# Patient Record
Sex: MALE | Race: WHITE | NOT HISPANIC OR LATINO | Employment: OTHER | URBAN - METROPOLITAN AREA
[De-identification: names, ages, dates, MRNs, and addresses within clinical notes are randomized per-mention and may not be internally consistent; named-entity substitution may affect disease eponyms.]

---

## 2020-03-31 ENCOUNTER — HOSPITAL ENCOUNTER (OUTPATIENT)
Dept: SURGERY | Facility: CLINIC | Age: 70
Discharge: HOME | End: 2020-03-31
Attending: UROLOGY

## 2020-03-31 ENCOUNTER — OFFICE VISIT CONVERTED (OUTPATIENT)
Dept: UROLOGY | Facility: CLINIC | Age: 70
End: 2020-03-31
Attending: UROLOGY

## 2020-03-31 LAB
APPEARANCE UR: CLEAR
BILIRUB UR QL: NEGATIVE
COLOR UR: YELLOW
CONV COLLECTION SOURCE (UA): NORMAL
CONV UROBILINOGEN IN URINE BY AUTOMATED TEST STRIP: 0.2 {EHRLICHU}/DL (ref 0.1–1)
GLUCOSE UR QL: NEGATIVE MG/DL
HGB UR QL STRIP: NEGATIVE
KETONES UR QL STRIP: NEGATIVE MG/DL
LEUKOCYTE ESTERASE UR QL STRIP: NEGATIVE
NITRITE UR QL STRIP: NEGATIVE
PH UR STRIP.AUTO: 6.5 [PH] (ref 5–8)
PROT UR QL: NEGATIVE MG/DL
SP GR UR: 1.02 (ref 1–1.03)

## 2020-06-02 ENCOUNTER — TELEPHONE CONVERTED (OUTPATIENT)
Dept: UROLOGY | Facility: CLINIC | Age: 70
End: 2020-06-02
Attending: UROLOGY

## 2020-09-08 ENCOUNTER — TELEPHONE CONVERTED (OUTPATIENT)
Dept: UROLOGY | Facility: CLINIC | Age: 70
End: 2020-09-08
Attending: UROLOGY

## 2020-09-28 ENCOUNTER — PROCEDURE VISIT CONVERTED (OUTPATIENT)
Dept: UROLOGY | Facility: CLINIC | Age: 70
End: 2020-09-28
Attending: UROLOGY

## 2020-09-28 ENCOUNTER — HOSPITAL ENCOUNTER (OUTPATIENT)
Dept: UROLOGY | Facility: CLINIC | Age: 70
Discharge: HOME | End: 2020-09-28
Attending: UROLOGY

## 2020-10-01 LAB
BACTERIA UR CULT: ABNORMAL
CIPROFLOXACIN SUSC ISLT: <=0.5
DAPTOMYCIN SUSC ISLT: 0.25
DOXYCYCLINE SUSC ISLT: 1
ERYTHROMYCIN SUSC ISLT: >=8
GENTAMICIN SUSC ISLT: <=0.5
LEVOFLOXACIN SUSC ISLT: <=0.12
NITROFURANTOIN SUSC ISLT: <=16
OXACILLIN SUSC ISLT: >=4
RIFAMPIN SUSC ISLT: <=0.5
TETRACYCLINE SUSC ISLT: <=1
TIGECYCLINE SUSC ISLT: 0.25
TMP SMX SUSC ISLT: <=10
VANCOMYCIN SUSC ISLT: 1

## 2020-10-19 ENCOUNTER — PROCEDURE VISIT CONVERTED (OUTPATIENT)
Dept: UROLOGY | Facility: CLINIC | Age: 70
End: 2020-10-19
Attending: UROLOGY

## 2020-11-20 ENCOUNTER — OFFICE VISIT CONVERTED (OUTPATIENT)
Dept: UROLOGY | Facility: CLINIC | Age: 70
End: 2020-11-20
Attending: UROLOGY

## 2020-12-08 ENCOUNTER — HOSPITAL ENCOUNTER (OUTPATIENT)
Dept: SURGERY | Facility: CLINIC | Age: 70
Discharge: HOME | End: 2020-12-08
Attending: UROLOGY

## 2020-12-08 ENCOUNTER — OFFICE VISIT CONVERTED (OUTPATIENT)
Dept: UROLOGY | Facility: CLINIC | Age: 70
End: 2020-12-08
Attending: UROLOGY

## 2020-12-10 LAB — BACTERIA UR CULT: NORMAL

## 2020-12-22 ENCOUNTER — OFFICE VISIT CONVERTED (OUTPATIENT)
Dept: UROLOGY | Facility: CLINIC | Age: 70
End: 2020-12-22
Attending: UROLOGY

## 2021-02-09 ENCOUNTER — OFFICE VISIT CONVERTED (OUTPATIENT)
Dept: UROLOGY | Facility: CLINIC | Age: 71
End: 2021-02-09
Attending: UROLOGY

## 2021-02-09 ENCOUNTER — CONVERSION ENCOUNTER (OUTPATIENT)
Dept: SURGERY | Facility: CLINIC | Age: 71
End: 2021-02-09

## 2021-02-09 LAB
BILIRUB UR QL STRIP: NEGATIVE
COLOR UR: YELLOW
CONV CLARITY OF URINE: CLEAR
CONV PROTEIN IN URINE BY AUTOMATED TEST STRIP: NEGATIVE
CONV UROBILINOGEN IN URINE BY AUTOMATED TEST STRIP: NORMAL
GLUCOSE UR QL: NEGATIVE
HGB UR QL STRIP: NEGATIVE
KETONES UR QL STRIP: NEGATIVE
LEUKOCYTE ESTERASE UR QL STRIP: NORMAL
NITRITE UR QL STRIP: NEGATIVE
PH UR STRIP.AUTO: 7.5 [PH]
SP GR UR: 1.02

## 2021-05-10 NOTE — H&P
History and Physical      Patient Name: Pa Montano   Patient ID: [id number]   Sex: Male   YOB: 1950    Primary Care Provider: Piyush Lyon MD   Referring Provider: Piyush Lyon MD    Visit Date: March 31, 2020    Provider: Lui Weir MD   Location: [facility]   Location Address: [address] [place] [address]  [id number]   Location Phone: [phone]          Chief Complaint  · pt here for urologic issues      History Of Present Illness     69-year-old  gentleman with possible BPH and here today for elevated PSA    slower stream. Some trouble with initiation of stream. Nocturia X 3-4 .  Some daytime frequency every few.  No incontinence.  No history of prostate meds    UTI 6 weeks ago that was treated.  Did have a fever, frequency and urgency.     PVR today 72    Patient has had a CT several years ago that did show some nonobstructing stones.  He has never passed a kidney stone.  She was prescribed ciprofloxacin 500 mg twice a day for 30 days.  He finished this about 2 weeks ago.  No urologic family history    No cardiopulmonary history.  Patient does not smoke.  Patient does not use blood thinner.  Patient is very active.  Walks 5 miles every day.    Patient has been worked up for gross hematuria before    2002 cystoscopynegative, also had some imaging at that time that was negative      3/20 Cr 0.74, GFR 11    PSA    3/20  7.32  2/20  7.38  6/19 3.83  5/18 2.85  3/16 2.26  1/13 1.63  12/12 1.87  10/06 3.1       Past Medical History  Allergic rhinitis, chronic; Bladder Disorder; Kidney stones; Mood disorder; Prostate Disorder         Allergy List  NO KNOWN DRUG ALLERGIES         Review of Systems  · Constitutional  o Denies  o : chills  · Respiratory  o Denies  o : cough  · Gastrointestinal  o Denies  o : nausea       10 systems reviewed       Vitals  Date Time BP Position Site L\R Cuff Size HR RR TEMP (F) WT  HT  BMI kg/m2 BSA m2 O2 Sat HC        03/31/2020 08:50 AM       16  182lbs 6oz 6'   24.73 2.05           Physical Examination  · Constitutional  o Appearance  o : Well-appearing, well-developed, in no acute distress  · Respiratory  o Respiratory Effort  o : Unlabored breathing  o Auscultation of Lungs  o : Unlabored breathing, clear to auscultation bilaterally  · Cardiovascular  o Heart  o :   § Auscultation of Heart  § : Regular rate and rhythm, no murmurs  · Gastrointestinal  o Abdominal Examination  o : Nontender, nondistended, no rigidity or guarding, no hepatosplenomegaly  · Genitourinary  o Bladder  o : nonpalpable  o Penis  o : circumcised phallus with no masses or lesions  o Urethral Meatus  o : no inflammation present and no stenosis  o Scrotum and Scrotal Contents  o :   § Testes  § : Bilateral descended testicles no masses or lesions  o Digital Rectal Examination  o :   § Tone and Masses  § : Normal sphincter tone, no rectal masses present  § Prostate  § : prostate nontender with no nodules and normal consistency, 40 g  · Neurologic  o Mental Status Examination  o :   § Orientation  § : Grossly oriented to person, place and time, judgment and insight intact, normal mood and affect          Results  · In-Office Procedures  o Surgical procedure  § IOP - Bladder Scan/Residual Urine (08814)   § Specimen vol Ur: 72   o Lab procedure  § Automated Dipstick Urinalysis (Surg Spec) WITHOUT Micro [facility] (54188)   § Color Ur: Yellow   § Clarity Ur: Clear   § Glucose Ur Ql Strip: Negative   § Bilirub Ur Ql Strip: Negative   § Ketones Ur Ql Strip: Negative   § Sp Gr Ur Qn: 1.020   § Hgb Ur Ql Strip: Trace-intact   § pH Ur-LsCnc: 6.0   § Prot Ur Ql Strip: Negative   § Urobilinogen Ur Strip-mCnc: 0.2   § Nitrite Ur Ql Strip: Negative   § WBC Est Ur Ql Strip: Negative       Assessment  · Urinary tract infection     599.0/N39.0  · BPH (benign prostatic hyperplasia)     600.00/N40.0      Plan  · Orders  o PSA Screening, Ultrasensitive, MEDICARE [facility] () -  600.00/N40.0 - 05/31/2020  o Urinalysis dipstick auto w micro (44527) - 599.0/N39.0 - 03/31/2020  · Medications  o Medications have been Reconciled  o Transition of Care or Provider Policy  · Instructions  o Electronically Identified Patient Education Materials Provided Electronically       Records reviewed today and summarized in the chart    Trace blood will send urine for micro    Patient just finished ciprofloxacin a couple weeks ago.  Did discuss that his PSA elevation may be secondary to UTI with some prostatitis.  We will plan on rechecking a PSA in 2 months.  I did discuss with the patient possible he could have prostate cancer.  Patient understands this and knows he must follow-up or have the possibility of missing a urologic malignancy.  Patient voiced understanding    BPH    Patient is having some lower urinary tract symptoms and also had a recent UTI we will go ahead and start him on Flomax 0.4 mg daily.  Risk/benefits and side effects discussed today    PVR at follow-up    Greater than 30 minutes was used in counseling and coordination of care, with greater than 51% of this in face-to-face counseling             Electronically Signed by: Lui Weir MD -Author on April 3, 2020 07:34:44 AM

## 2021-05-10 NOTE — PROCEDURES
Procedure Note      Patient Name: Pa Montano   Patient ID: [id number]   Sex: Male   YOB: 1950    Primary Care Provider: Piyush Lyon MD   Referring Provider: Piyush Lyon MD    Visit Date: September 28, 2020    Provider: Lui Weir MD   Location: [facility]   Location Address: [address] [place] [address]  [id number]   Location Phone: [phone]          Cystoscopy Procedure:  The patients urine was viewe d under a microscope during his clinical visit: no RBC present, no WBC present, no Bacteria present.          PROCEDURE: Flexible cystoscope was passed per urethra into the bladder without difficulty after proper consent.     3 cm prostate, minor to moderate trabeculations throughout    The bladder was inspected in a systematic meridian fashion. There were no tumors, lesions, stones, or other abnormalities noted within the bladder. Of note, there was no increased vascularity as well. Both ureteral orifices were identified and were normal in appearance. The flexible cystoscope was removed. The patient tolerated the procedure well.           Assessment  · BPH (benign prostatic hyperplasia)     600.00/N40.0      Plan  · Orders  o Cystoscopy (43627) - 600.00/N40.0 - 09/28/2020  o Urine Culture (Clean Catch) [facility] (15745) - 600.00/N40.0 - 09/28/2020  · Medications  o Medications have been Reconciled  o Transition of Care or Provider Policy  · Instructions  o Electronically Identified Patient Education Materials Provided Electronically     Patient wanting to proceed with Rezum.  Risk and benefits discussed today.  He will follow-up for procedure             Electronically Signed by: Lui Weir MD -Author on September 28, 2020 03:36:29 PM

## 2021-05-10 NOTE — PROCEDURES
Procedure Note      Patient Name: Pa Montano   Patient ID: [id number]   Sex: Male   YOB: 1950    Primary Care Provider: Piyush Lyon MD   Referring Provider: Piyush Lyon MD    Visit Date: October 19, 2020    Provider: Lui Weir MD   Location: [facility]   Location Address: [address] [place] [address]  [id number]   Location Phone: [phone]          Pa Montano presents to the office today to undergo a Rezum procedure. The patient understands the material risks, possible benefits, and alternatives for symptomatic Benign Prostate Hypertrophy. The patient understands that BLADDER FUNCTION is independent of this de-obstructing procedure. Improved voiding results may take 2 to 4 weeks before being noticed depending on prostate size and number of treatments. Bleeding, infection and irritative voiding are the most common side effects. Patients will continue to use their current BPH meds until advised to taper or stop. The patient was given the opportunity to have their questions answered in a previous face to face meeting and also today. Informed consent signed by a patient.   Patient medications and allergies have been reviewed prior to treatment. Patients on blood thinners or aspirin (ASA) have been advised to check with the prescribing MD if it is okay to hold these prior to treatment. Risks of withholding blood thinners and or ASA have been discussed.   Prior to proceeding, transrectal ultrasound obtained prostate volume and dimensions.   Prostate: 4.8 g HT: 3.0 mm W: 5.0 mm L: 38.4 mm   Candida-prostatic block anesthesia was then performed using trans-rectal ultrasound guidance and a long 18-gauge spinal needle. 21 cc's of 1% lidocaine was injected bilaterally. Minimal blood loss.   Description of Procedure:   Transurethral destruction of prostate tissue; by radiofrequency thermotherapy. In the lithotomy position, under local anesthesia, the  patient was prepped and draped in the usual manner. The anterior urethra, prostate, bladder neck, right and left orifices were visualized as the scope was passed. RF was then used to create thermal energy to selectively ablate prostate tissue 1 cm from the bladder neck to the proximal end of the veru spaced 1 cm apart.   4 Treatments were given. Treatments: Rt. Side 0-4 2, Lt. Side 0-4 2. Median lobe 0-3 0. Blood loss was minimal. The patient was monitored throughout the procedure.   After the completion of the procedure the treatment device was removed. There was a careful check that there were no clots in the bladder or injury to the bladder, prostate, or urethra. Murdock catheter was then placed.           Assessment  · Benign prostatic hyperplasia with lower urinary tract symptoms     600.01/N40.1      Plan  · Orders  o Transurethral destruction of prostate tissue; by radiofrequency-generated water vapor thermotherapy (12490) - 600.01/N40.1 - 10/19/2020  · Medications  o Medications have been Reconciled  o Transition of Care or Provider Policy  · Instructions  o Urine was negative for nitrite, WBC, and RBC's.   o The patient was observed, appeared stable and in good condition at the time of discharge. Post-procedure expectations and activity limitations were reviewed with him and written instructions were provided. The patient is going home with a Murdock catheter, therefore catheter care and PRN irrigation instructions were reviewed and written post-op instructions were provided. He is scheduled to return to the office for its removal. He was also instructed to continue mild oral analgesia PRN. The patient was then transported by our attending staff member to his car by wheelchair. His care was given unto a consenting adult who is responsible for his transportation home.  o Electronically Identified Patient Education Materials Provided Electronically            Electronically Signed by: Lui Weir MD  -Author on October 19, 2020 04:03:51 PM

## 2021-05-13 NOTE — PROGRESS NOTES
Progress Note      Patient Name: Pa Montano   Patient ID: [id number]   Sex: Male   YOB: 1950    Primary Care Provider: Piyush Lyon MD   Referring Provider: Piyush Lyon MD    Visit Date: June 2, 2020    Provider: Lui Weir MD   Location: [facility]   Location Address: [address] [place] [address]  [id number]   Location Phone: [phone]          Chief Complaint  · pt here for urologic issues      History Of Present Illness  Pa Montano is a 69 year old /White male who is presenting for evaluation via telephone call. Verbal consent obtained before beginning visit.   The following staff were present during this visit: [employer].      15 minutes was used on this telephone call    69-year-old  gentleman with possible BPH and here today for elevated PSA    On Flomax since his last visit. stream is better.  Nocturia X 2 .  freq is better.  No incontinence.  Minimal dizziness.    PVR     3/20  72    ciprofloxacin 500 mg twice a day for 30 days in March.      3/20 UAnegative    pt did not do his PSA.    Previous    Patient has had a CT several years ago that did show some nonobstructing stones.  He has never passed a kidney stone.    No cardiopulmonary history.  Patient does not smoke.  Patient does not use blood thinner.  Patient is very active.  Walks 5 miles every day.    Patient has been worked up for gross hematuria before    2002 cystoscopy negative, also had some imaging at that time that was negative      3/20 Cr 0.74, GFR 11    PSA    3/20  7.32  2/20  7.38  6/19 3.83  5/18 2.85  3/16 2.26  1/13 1.63  12/12 1.87  10/06 3.1       Past Medical History  Allergic rhinitis, chronic; Bladder Disorder; Kidney stones; Mood disorder; Prostate Disorder         Past Surgical History  Wrist Surgery         Medication List  citalopram 20 mg oral tablet; Flomax 0.4 mg oral capsule; ibuprofen 200 mg oral tablet; lorazepam 2 mg oral tablet;  Vitamin D3 oral         Allergy List  NO KNOWN DRUG ALLERGIES       Allergies Reconciled  Social History  Tobacco (Never)         Review of Systems  · Constitutional  o Denies  o : chills  · Respiratory  o Denies  o : cough  · Gastrointestinal  o Denies  o : nausea       10 systems reviewed       Vitals  Date Time BP Position Site L\R Cuff Size HR RR TEMP (F) WT  HT  BMI kg/m2 BSA m2 O2 Sat HC       03/31/2020 08:50 AM       16  182lbs 6oz 6'   24.73 2.05               Assessment  · Urinary tract infection     599.0/N39.0  · BPH (benign prostatic hyperplasia)     600.00/N40.0    Problems Reconciled  Plan  · Orders  o Physician Telephone Evaluation, 11-20 minutes (67340) - 599.0/N39.0, 600.00/N40.0 - 06/02/2020  · Medications  o Medications have been Reconciled  o Transition of Care or Provider Policy  · Instructions  o Electronically Identified Patient Education Materials Provided Electronically     BPH    Continue Flomax 0.4 mg daily      Elevated PSA    Patient has not yet done his PSA he would get this done at [place].  He will call and let my nurse know so she can acquire these records.    Follow-up in 6 months with a PVR             Electronically Signed by: Lui Weir MD -Author on June 2, 2020 02:29:39 PM

## 2021-05-13 NOTE — PROGRESS NOTES
Progress Note      Patient Name: Pa Montano   Patient ID: [id number]   Sex: Male   YOB: 1950    Primary Care Provider: Piyush Lyon MD   Referring Provider: Piyush Lyon MD    Visit Date: November 20, 2020    Provider: Lui Weir MD   Location: [facility]   Location Address: [address] [place] [address]  [id number]   Location Phone: [phone]          Chief Complaint  · pt here for urologic issues      History Of Present Illness         69-year-old  gentleman with possible BPH and here today for elevated PSA    10/19/20  Rezum    Patient has some minimal dysuria and burning.  Some frequency.  Minimal gross hematuria.    no prostate meds.    PVR     11/20  25  3/20    72    Previous    Flomax 0.4-  caused sever dizzeness.  Stopped this    ciprofloxacin 500 mg twice a day for 30 days in March.      3/20 UA negative    Patient has had a CT several years ago that did show some nonobstructing stones.  He has never passed a kidney stone.    No cardiopulmonary history.  Patient does not smoke.  Patient does not use blood thinner.  Patient is very active.  Walks 5 miles every day.    Patient has been worked up for gross hematuria before    2002 cystoscopy negative, also had some imaging at that time that was negative      3/20 Cr 0.74, GFR 11    PSA    6/20   4.38  3/20  7.32  2/20  7.38  6/19 3.83  5/18 2.85  3/16 2.26  1/13 1.63  12/12 1.87  10/06 3.1       Past Medical History  Allergic rhinitis, chronic; Bladder Disorder; BPH; Kidney stones; Mood disorder; Prostate Disorder; UTI         Past Surgical History  Wrist Surgery         Medication List  citalopram 20 mg oral tablet; ibuprofen 200 mg oral tablet; lorazepam 2 mg oral tablet; Valium 5 mg oral tablet; Vitamin D3 oral         Allergy List  NO KNOWN DRUG ALLERGIES         Social History  Tobacco (Never)         Review of Systems  · Constitutional  o Denies  o : chills,  fever  · Gastrointestinal  o Denies  o : nausea, vomiting      Physical Examination  · Constitutional  o Appearance  o : Well-appearing, well-developed, in no acute distress  · Respiratory  o Respiratory Effort  o : Unlabored breathing  · Neurologic  o Mental Status Examination  o :   § Orientation  § : Grossly oriented to person, place and time, judgment and insight intact, normal mood and affect          Results  · In-Office Procedures  o Surgical procedure  § IOP - Bladder Scan/Residual Urine (00155)   § Specimen vol Ur: 028       Assessment  · BPH (benign prostatic hyperplasia)     600.00/N40.0  · Elevated PSA     790.93/R97.20      Plan  · Orders  o PSA Ultrasensitive, ANNUAL SCREENING [facility] (00556, ) - 790.93/R97.20 - 06/25/2021  · Medications  o Medications have been Reconciled  o Transition of Care or Provider Policy  · Instructions  o Electronically Identified Patient Education Materials Provided Electronically       Doing well after procedure.      F/u 6/21 with PSA.      PVR at f/u             Electronically Signed by: Lui Weir MD -Author on November 20, 2020 12:03:26 PM

## 2021-05-13 NOTE — PROGRESS NOTES
Progress Note      Patient Name: Pa Montano   Patient ID: [id number]   Sex: Male   YOB: 1950    Primary Care Provider: Piyush Lyon MD   Referring Provider: Piyush Lyon MD    Visit Date: September 8, 2020    Provider: Lui Weir MD   Location[facility]   Location Address: [address] [place] [address]  [id number]   Location Phone: [phone]          Chief Complaint  · pt here for urologic issues      History Of Present Illness  TELEHEALTH TELEPHONE VISIT  Pa Montano is a 69 year old /White male who is presenting for evaluation via telehealth telephone visit. Verbal consent obtained before beginning visit.   Provider spent 13 minutes with the patient during the telehealth visit.   The following staff were present during this visit: Zakiya Gar   Past Medical History/ Overview of Patient Symptoms         69-year-old  gentleman with possible BPH and here today for elevated PSA    Pt with weak stream.  Hard to initiate.  Nocturia times 4.  Flomax did help.    Flomax 0.4-  caused sever dizzeness.  Stopped this.    No incontinence.  Gross hematuria, some intermittent dysuria, not too bad    PVR     3/20  72    Previous    ciprofloxacin 500 mg twice a day for 30 days in March.      3/20 UA negative    Patient has had a CT several years ago that did show some nonobstructing stones.  He has never passed a kidney stone.    No cardiopulmonary history.  Patient does not smoke.  Patient does not use blood thinner.  Patient is very active.  Walks 5 miles every day.    Patient has been worked up for gross hematuria before    2002 cystoscopy negative, also had some imaging at that time that was negative      3/20 Cr 0.74, GFR 11    PSA    6/20   4.38  3/20  7.32  2/20  7.38  6/19 3.83  5/18 2.85  3/16 2.26  1/13 1.63  12/12 1.87  10/06 3.1       Past Medical History  Allergic rhinitis, chronic; Bladder Disorder; BPH; Kidney stones;  Mood disorder; Prostate Disorder; UTI         Past Surgical History  Wrist Surgery         Medication List  citalopram 20 mg oral tablet; ibuprofen 200 mg oral tablet; lorazepam 2 mg oral tablet; Vitamin D3 oral         Allergy List  NO KNOWN DRUG ALLERGIES         Social History  Tobacco (Never)         Review of Systems  · Constitutional  o Denies  o : chills  · Respiratory  o Denies  o : cough  · Gastrointestinal  o Denies  o : nausea              Assessment  · BPH (benign prostatic hyperplasia)     600.00/N40.0  · Elevated PSA     790.93/R97.20    Problems Reconciled  Plan  · Orders  o Physician Telephone Evaluation, 11-20 minutes (37770) - 600.00/N40.0, 790.93/R97.20 - 09/08/2020  · Medications  o Medications have been Reconciled  o Transition of Care or Provider Policy  · Instructions  o Plan Of Care:   o Electronically Identified Patient Education Materials Provided Electronically       BPH    Stop Flomax, cannot tolerate.  Does not want to try another alpha-blocker.    Patient interested in procedures, after discussion he is not interested in TURP.  Very worried about sexual dysfunction.  We did discuss risk and benefits of Rezum.  After discussion patient is very interested in this and would like to proceed if possible.  He has had a lot of trouble with office procedure in the past but said he is willing to give this a try    Follow-up for office cystoscopy in preparation for Rezum    Elevated PSA    PSA is increased from previous PSAs but lower than last year.  At this time still low for his age.  We will continue to follow this conservatively moving forward.  Patient understands.                 Electronically Signed by: Lui Weir MD -Author on September 8, 2020 08:36:00 AM

## 2021-05-13 NOTE — PROGRESS NOTES
Progress Note      Patient Name: Pa Montano   Patient ID: [id number]   Sex: Male   YOB: 1950    Primary Care Provider: Piyush Lyon MD   Referring Provider: Piyush Lyon MD    Visit Date: December 8, 2020    Provider: Lui Weir MD   Location: [facility]   Location Address: [address] [place] [address]  [id number]   Location Phone: [phone]          Chief Complaint  · urologic issues      History Of Present Illness       70-year-old  gentleman with possible BPH and here today for elevated PSA    pt was voiding well up to last week.  Good stream.  Since last week over the weekend patient started having more trouble.  He has had harder initiation of stream and slower stream over the last few days.  He is having some urgency and frequency.    Some burning and dysuria.    no prostate meds.    PVR       12/20    408  10/19/20  Rezum  11/20  25  3/20    72    Previous    Flomax 0.4-  caused sever dizzeness.  Stopped this    ciprofloxacin 500 mg twice a day for 30 days in March.      Patient has had a CT several years ago that did show some nonobstructing stones.  He has never passed a kidney stone.    No cardiopulmonary history.  Patient does not smoke.  Patient does not use blood thinner.  Patient is very active.  Walks 5 miles every day.    Patient has been worked up for gross hematuria before    2002 cystoscopy negative, also had some imaging at that time that was negative      3/20 Cr 0.74, GFR 11    PSA    6/20   4.38  3/20  7.32  2/20  7.38  6/19 3.83  5/18 2.85  3/16 2.26  1/13 1.63  12/12 1.87  10/06 3.1       Past Medical History  Allergic rhinitis, chronic; Bladder Disorder; BPH; Kidney stones; Mood disorder; Prostate Disorder; UTI         Past Surgical History  Rezum procedure; Wrist Surgery         Medication List  citalopram 20 mg oral tablet; ibuprofen 200 mg oral tablet; lorazepam 2 mg oral tablet; Vitamin D3 oral         Allergy  List  NO KNOWN DRUG ALLERGIES         Social History  Tobacco (Never)         Review of Systems  · Constitutional  o Denies  o : chills  · Respiratory  o Denies  o : cough  · Gastrointestinal  o Denies  o : nausea      Physical Examination  · Constitutional  o Appearance  o : Well-appearing, well-developed, in no acute distress  · Respiratory  o Respiratory Effort  o : Unlabored breathing  · Cardiovascular  o Heart  o :   § Auscultation of Heart  § : Regular rate and rhythm, no murmurs  · Neurologic  o Mental Status Examination  o :   § Orientation  § : Grossly oriented to person, place and time, judgment and insight intact, normal mood and affect          Results  · In-Office Procedures  o Surgical procedure  § IOP - Bladder Scan/Residual Urine (46014)   § Specimen vol Ur: 408   o Lab procedure  § Automated Dipstick Urinalysis (Surg Spec) WITHOUT Micro H (72155)   § Color Ur: Yellow   § Clarity Ur: Clear   § Glucose Ur Ql Strip: Negative   § Bilirub Ur Ql Strip: Negative   § Ketones Ur Ql Strip: Negative   § Sp Gr Ur Qn: less than 1.000   § Hgb Ur Ql Strip: Negative   § pH Ur-LsCnc: 5.0   § Prot Ur Ql Strip: Negative   § Urobilinogen Ur Strip-mCnc: 0.2 E.U./dL   § Nitrite Ur Ql Strip: Negative   § WBC Est Ur Ql Strip: Small       Assessment  · BPH (benign prostatic hyperplasia)     600.00/N40.0  · Elevated PSA     790.93/R97.20  · Prostatitis     601.9/N41.9      Plan  · Orders  o BMP Non-fasting [facility] ([id number]) - 601.9/N41.9 - 12/08/2020  o Urine Culture (Clean Catch) [facility] ([id number]) - 601.9/N41.9, 600.00/N40.0 - 12/08/2020  · Medications  o Medications have been Reconciled  o Transition of Care or Provider Policy  · Instructions  o Electronically Identified Patient Education Materials Provided Electronically       Patient with likely prostatitis.  After discussion we will place him on ciprofloxacin 500 mg p.o. twice daily x4 weeks.      Risk/benefit and side effects discussed    BMP    Urine culture    Follow-up in 2  weeks with a PVR                 Electronically Signed by: Lui Weir MD -Author on December 8, 2020 02:58:01 PM

## 2021-05-14 VITALS — RESPIRATION RATE: 13 BRPM | HEIGHT: 72 IN | WEIGHT: 175.5 LBS | BODY MASS INDEX: 23.77 KG/M2

## 2021-05-14 NOTE — PROGRESS NOTES
Progress Note      Patient Name: Pa Montano   Patient ID: [id number]   Sex: Male   YOB: 1950    Primary Care Provider: Piyush Lyon MD   Referring Provider: Piyush Lyon MD    Visit Date: February 9, 2021    Provider: Lui Weir MD   Location[place] [facility]   Location Address: [address] [place] [address]  [id number]   Location Phone: [phone]          Chief Complaint  · urological issues      History Of Present Illness       70-year-old  gentleman h/o BPH, ED and elevated PSA    Patient over the last 1.5 months has past several small stones.  No pain. No blood.    Voiding better since rezum.  Does think Rezum has helped.  Stream, no burning or dysuria.    With recent prostatitis, did ciprofloxacin and now is better.    no prostate meds.     cannot maintain erection throughout intercourse.  Has a prescription for sildenafil, has not tried this yet.    No cardiopulmonary history.  Patient does not smoke.  Patient does not use blood thinner.  Patient is very active.  Walks 5 miles every day.    PVR     2/21   50  12/20    120  12/20    408  10/19/20  Rezum  11/20  25  3/20    72    Previous    9/20 cystoscopy3 cm prostate, minor to moderate trabeculations, negative otherwise.    Flomax 0.4-  caused sever dizzeness.  Stopped this    Patient has had a CT several years ago that did show some nonobstructing stones.  He has never passed a kidney stone.    Patient has been worked up for gross hematuria before    2002 cystoscopy negative, also had some imaging at that time that was negative      3/20 Cr 0.74, GFR 11    PSA    6/20   4.38  3/20  7.32  2/20  7.38  6/19 3.83  5/18 2.85  3/16 2.26  1/13 1.63  12/12 1.87  10/06 3.1       Past Medical History  Allergic rhinitis, chronic; Bladder Disorder; BPH; Kidney stones; Mood disorder; Prostate Disorder; UTI         Past Surgical History  Rezum procedure; Wrist Surgery         Medication  List  citalopram 20 mg oral tablet; ibuprofen 200 mg oral tablet; lorazepam 2 mg oral tablet; sildenafil 20 mg; Vitamin D3 oral         Allergy List  NO KNOWN DRUG ALLERGIES         Social History  Alcohol (Current some day); Tobacco (Never)         Review of Systems  · Constitutional  o Denies  o : fatigue, night sweats  · Eyes  o Denies  o : double vision, blurred vision  · HENT  o Denies  o : vertigo, recent head injury  · Breasts  o Denies  o : abnormal changes in breast size, additional breast symptoms except as noted in the HPI  · Cardiovascular  o Denies  o : chest pain, irregular heart beats  · Respiratory  o Denies  o : shortness of breath, productive cough  · Gastrointestinal  o Denies  o : nausea, vomiting  · Genitourinary  o Admits  o : urgency, frequency  o Denies  o : dysuria, urinary retention  · Integument  o Denies  o : hair growth change, new skin lesions  · Neurologic  o Denies  o : altered mental status, seizures  · Musculoskeletal  o Denies  o : joint swelling, limitation of motion  · Endocrine  o Denies  o : cold intolerance, heat intolerance  · Heme-Lymph  o Denies  o : petechiae, lymph node enlargement or tenderness  · Allergic-Immunologic  o Denies  o : frequent illnesses      Physical Examination  · Constitutional  o Appearance  o : Well-appearing, well-developed, in no acute distress  · Respiratory  o Respiratory Effort  o : Unlabored breathing  · Neurologic  o Mental Status Examination  o :   § Orientation  § : Grossly oriented to person, place and time, judgment and insight intact, normal mood and affect          Results  · In-Office Procedures  o Surgical procedure  § IOP - Bladder Scan/Residual Urine (87426)   § Specimen vol Ur: 50   o Lab procedure  § Automated Dipstick Urinalysis (Surg Spec) WITHOUT Micro [facility] (23699)   § Color Ur: Yellow   § Clarity Ur: Clear   § Glucose Ur Ql Strip: Negative   § Bilirub Ur Ql Strip: Negative   § Ketones Ur Ql Strip: Negative   § Sp Gr Ur Qn: 1.020    § Hgb Ur Ql Strip: Negative   § pH Ur-LsCnc: 7.5   § Prot Ur Ql Strip: Negative   § Urobilinogen Ur Strip-mCnc: 0.2 E.U./dL   § Nitrite Ur Ql Strip: Negative   § WBC Est Ur Ql Strip: Trace       Assessment  · BPH (benign prostatic hyperplasia)     600.00/N40.0  · Elevated PSA     790.93/R97.20      Plan  · Medications  o Medications have been Reconciled  o Transition of Care or Provider Policy  · Instructions  o Electronically Identified Patient Education Materials Provided Electronically       Prostate cancer screening    Follow-up with nurse practitioner in 12/21 with PSA    Patient has been passing some stones, discussed with him that likely this is some calcifications from where we did Rezum.  Cystoscopy back a few months ago was negative.  Patient given reassurance.  At this time no further work-up needed unless he starts having more trouble/stones    ED    Has not yet tried sildenafil he may in the future                 Electronically Signed by: Lui Weir MD -Author on February 9, 2021 01:28:25 PM

## 2021-05-14 NOTE — PROGRESS NOTES
Progress Note      Patient Name: Pa Montano   Patient ID: [id number]   Sex: Male   YOB: 1950    Primary Care Provider: Piyush Lyon MD   Referring Provider: Piyush Lyon MD    Visit Date: December 22, 2020    Provider: Lui Weir MD   Location: [facility] [place] [facility]   Location Address: [address] [place] [address]  [id number]   Location Phone: [phone]          Chief Complaint  · pt is having urological issues      History Of Present Illness       70-year-old  gentleman h/o BPH, ED and elevated PSA    Voiding well.  Symptoms have abated. Does think Rezum has helped.    No GH/burning / dysuria.    Patient is still taking ciprofloxacin, he has about a week left.  No side effects.    no prostate meds.    Patient can get an erection but cannot maintain erection throughout intercourse.  He is not had any treatment at this point.    PVR     12/20    120  12/20    408  10/19/20  Rezum  11/20  25  3/20    72    Previous    Flomax 0.4-  caused sever dizzeness.  Stopped this    ciprofloxacin 500 mg twice a day for 30 days in 3/20       Patient has had a CT several years ago that did show some nonobstructing stones.  He has never passed a kidney stone.    No cardiopulmonary history.  Patient does not smoke.  Patient does not use blood thinner.  Patient is very active.  Walks 5 miles every day.    Patient has been worked up for gross hematuria before    2002 cystoscopy negative, also had some imaging at that time that was negative      3/20 Cr 0.74, GFR 11    PSA    6/20   4.38  3/20  7.32  2/20  7.38  6/19 3.83  5/18 2.85  3/16 2.26  1/13 1.63  12/12 1.87  10/06 3.1       Past Medical History  Allergic rhinitis, chronic; Bladder Disorder; BPH; Kidney stones; Mood disorder; Prostate Disorder; UTI         Past Surgical History  Rezum procedure; Wrist Surgery         Medication List  ciprofloxacin HCl 500 mg oral tablet; citalopram 20 mg oral tablet; ibuprofen  200 mg oral tablet; lorazepam 2 mg oral tablet; sildenafil 20 mg; Vitamin D3 oral         Allergy List  NO KNOWN DRUG ALLERGIES         Social History  Alcohol (Current some day); Tobacco (Never)         Review of Systems  · Constitutional  o Denies  o : fever, headache, chills  · Eyes  o Denies  o : eye pain, double vision, blurred vision  · HENT  o Denies  o : sinus problems, sore throat, ear infection  · Cardiovascular  o Denies  o : chest pain, high blood pressure, varicosities  · Respiratory  o Denies  o : shortness of breath, wheezing, frequent cough  · Gastrointestinal  o Denies  o : nausea, vomiting, heartburn, indigestion, abdominal pain  · Genitourinary  o Admits  o : frequency, nocturia  o Denies  o : urgency, urinary retention, painful urination  · Integument  o Denies  o : rash, itching, boils  · Neurologic  o Denies  o : tingling or numbness, tremors, dizzy spells  · Musculoskeletal  o Denies  o : joint pain, neck pain, back pain  · Endocrine  o Denies  o : cold intolerance, heat intolerance, tired, excessive thirst, sluggish  · Psychiatric  o Admits  o : feels satisfied with life  o Denies  o : severe depression, concerns with hurting themselves  · Heme-Lymph  o Denies  o : swollen glands, blood clotting problems  · Allergic-Immunologic  o Denies  o : sinus allergy symptoms, hay fever      Vitals  Date Time BP Position Site L\R Cuff Size HR RR TEMP (F) WT  HT  BMI kg/m2 BSA m2 O2 Sat FR L/min FiO2        12/22/2020 01:37 PM       13  175lbs 8oz 6'   23.8 2.01             Physical Examination  · Constitutional  o Appearance  o : Well-appearing, well-developed, in no acute distress  · Respiratory  o Respiratory Effort  o : Unlabored breathing  · Cardiovascular  o Heart  o :   § Auscultation of Heart  § : Regular rate and rhythm, no murmurs  · Neurologic  o Mental Status Examination  o :   § Orientation  § : Grossly oriented to person, place and time, judgment and insight intact, normal mood and  affect          Results  · In-Office Procedures  o Surgical procedure  § IOP - Bladder Scan/Residual Urine (91114)   § Specimen vol Ur: 132       Assessment  · BPH (benign prostatic hyperplasia)     600.00/N40.0  · Elevated PSA     790.93/R97.20  · Prostatitis     601.9/N41.9      Plan  · Orders  o PSA ultrasensitive DIAGNOSTIC [facility] ([id number]) - 790.93/R97.20 - 12/22/2021  · Medications  o Medications have been Reconciled  o Transition of Care or Provider Policy  · Instructions  o Electronically Identified Patient Education Materials Provided Electronically     Prostatitis    Patient will finish ciprofloxacin, doing much better.    Elevated PSA    PSA was better at last check, I will have him follow-up with PSA with nurse practitioner in 1 year.  If stays stable for the next few years could likely stop further screening      ED    Patient would like treatment, will place him on sildenafil 20 mg 1-5 tablets as needed sexual intercourse.  Risk/benefits and side effects discussed today.  Patient understands he has an erection for greater than 4 hours he should go the emergency room or risk never having erection again.    PVR at f/u             Electronically Signed by: Lui Weir MD -Author on December 22, 2020 02:29:51 PM

## 2021-05-15 VITALS — RESPIRATION RATE: 16 BRPM | HEIGHT: 72 IN | WEIGHT: 182.37 LBS | BODY MASS INDEX: 24.7 KG/M2

## 2021-06-14 PROBLEM — R35.0 BENIGN PROSTATIC HYPERPLASIA WITH URINARY FREQUENCY: Status: ACTIVE | Noted: 2021-06-14

## 2021-06-14 PROBLEM — N40.1 BENIGN PROSTATIC HYPERPLASIA WITH URINARY FREQUENCY: Status: ACTIVE | Noted: 2021-06-14

## 2021-06-14 PROBLEM — R97.20 ELEVATED PSA: Status: ACTIVE | Noted: 2021-06-14

## 2021-06-14 NOTE — PROGRESS NOTES
Chief Complaint    Urologic complaint    Subjective          Pa Montano presents to [facility] UROLOG[facility]  History of Present Illness     70-year-old  gentleman h/o BPH, ED and elevated PSA     patient has been doing okay, no prostatitis.  He has voiding with okay stream.    Patient has had any gross hematuria or stones like he was passing before for a few months.    Does think Rezum has helped.   no burning or dysuria.    With recent prostatitis, did ciprofloxacin and now is better.    no prostate meds.    Sildenafil 20 mg-does help, can cause headache    No cardiopulmonary history.  Patient does not smoke.  Patient does not use blood thinner.  Patient is very active.  Walks 5 miles every day.    Results for orders placed or performed in visit on 06/15/21   Bladder Scan   Result Value Ref Range    Volume 0          PVR     2/21      50  12/20    120  12/20    408  10/19/20  Rezum  11/20  25  3/20    72    Previous    9/20 cystoscopy3 cm prostate, minor to moderate trabeculations, negative otherwise.    Flomax 0.4-  caused sever dizzeness.  Stopped this    Patient has had a CT several years ago that did show some nonobstructing stones.  He has never passed a kidney stone.    Patient has been worked up for gross hematuria before    2002 cystoscopy negative, also had some imaging at that time that was negative      3/20 Cr 0.74, GFR 11    PSA    6/20   4.38  3/20  7.32  2/20  7.38  6/19 3.83  5/18 2.85  3/16 2.26  1/13 1.63  12/12 1.87  10/06 3.1    Past History:  Medical History: has a past medical history of Bladder disorder, BPH (benign prostatic hyperplasia), Chronic allergic rhinitis, Kidney stones, Mood disorder (CMS/HCC), Prostate disorder, and UTI (urinary tract infection).   Surgical History: has a past surgical history that includes Wrist surgery and Other surgical history.   Family History: family history is not on file.   Social History: reports that he has never smoked. He  does not have any smokeless tobacco history on file. He reports current alcohol use.  Allergies: Patient has no allergy information on record.     No current outpatient medications on file.     Physical exam       Alert and orient x3  Well appearing, well developed, in no acute distress   Unlabored respirations  Nontender/nondistended      Grossly oriented to person, place and time, judgment is intact, normal mood and affect    Results for orders placed or performed in visit on 02/09/21   Urinalysis without microscopic (no culture) -   Result Value Ref Range    Leukocytes, UA Trace     Nitrite, UA Negative     Urobilinogen, UA 0.2 E.U./dL     Protein, UA Negative     pH, UA 7.5     Blood, UA Negative     Specific Gravity, UA 1.020     Ketones, UA Negative     Bilirubin, UA Negative     Glucose, UA Negative     Appearance Clear     Color, UA Yellow         Objective     Vital Signs:   There were no vitals taken for this visit.             Assessment and Plan    Diagnoses and all orders for this visit:    1. Benign prostatic hyperplasia with urinary frequency (Primary)    2. Elevated PSA      Voiding okay, no meds needed at this time     PSA in 12/21, follow-up with nurse practitioner    ED    Sildenafil helping continue as needed

## 2021-06-15 ENCOUNTER — OFFICE VISIT (OUTPATIENT)
Dept: UROLOGY | Facility: CLINIC | Age: 71
End: 2021-06-15

## 2021-06-15 VITALS — WEIGHT: 175 LBS | RESPIRATION RATE: 17 BRPM | BODY MASS INDEX: 23.7 KG/M2 | HEIGHT: 72 IN

## 2021-06-15 DIAGNOSIS — N40.1 BENIGN PROSTATIC HYPERPLASIA WITH URINARY FREQUENCY: Primary | ICD-10-CM

## 2021-06-15 DIAGNOSIS — R35.0 BENIGN PROSTATIC HYPERPLASIA WITH URINARY FREQUENCY: Primary | ICD-10-CM

## 2021-06-15 DIAGNOSIS — R97.20 ELEVATED PSA: ICD-10-CM

## 2021-06-15 LAB — SPECIMEN VOL 24H UR: 0 L

## 2021-06-15 PROCEDURE — 51798 US URINE CAPACITY MEASURE: CPT | Performed by: UROLOGY

## 2021-06-15 PROCEDURE — 99213 OFFICE O/P EST LOW 20 MIN: CPT | Performed by: UROLOGY

## 2021-06-15 RX ORDER — SILDENAFIL CITRATE 20 MG/1
TABLET ORAL
Status: ON HOLD | COMMUNITY
Start: 2020-12-22 | End: 2022-05-14

## 2021-06-15 RX ORDER — BUSPIRONE HYDROCHLORIDE 15 MG/1
15 TABLET ORAL 2 TIMES DAILY
COMMUNITY
Start: 2021-04-13

## 2021-06-15 RX ORDER — MOMETASONE FUROATE 1 MG/G
CREAM TOPICAL
Status: ON HOLD | COMMUNITY
Start: 2021-04-13 | End: 2022-05-14

## 2021-06-15 RX ORDER — CITALOPRAM 20 MG/1
20 TABLET ORAL DAILY
COMMUNITY
Start: 2021-04-13

## 2021-06-15 RX ORDER — LORAZEPAM 1 MG/1
1 TABLET ORAL 3 TIMES DAILY PRN
COMMUNITY
Start: 2021-06-02 | End: 2023-01-17

## 2021-10-14 ENCOUNTER — TELEPHONE (OUTPATIENT)
Dept: UROLOGY | Facility: CLINIC | Age: 71
End: 2021-10-14

## 2021-10-14 DIAGNOSIS — R30.0 DYSURIA: Primary | ICD-10-CM

## 2021-10-14 NOTE — TELEPHONE ENCOUNTER
----- Message from Jose Victoria sent at 10/14/2021  1:52 PM EDT -----  Regarding: UA ORDER  PT HAS AN APPT WITH DR OLVERA IN DEC BUT HE IS HAVING SOME BURNING, FREQUENCY AT NIGHT, WEAK STREAM, HE WANT TO KNOW IF YOU CAN PUT IN AN ORDER FOR A UA AND [facility]CB# [phone].IF HE DONT ANSWER LMOM.

## 2021-12-13 PROBLEM — N52.9 ERECTILE DYSFUNCTION: Status: ACTIVE | Noted: 2021-12-13

## 2021-12-13 NOTE — PROGRESS NOTES
Chief Complaint    Urologic complaint    Subjective          Pa Montano presents to [facility] UROLOGY  History of Present Illness     70-year-BPH status post Rezum in 2/20, ED    voiding okay.    No GH     history of prostatitis    no prostate meds.    Sildenafil 20 mg-does help, can cause headache        No cardiopulmonary history.  Patient does not smoke.  Patient does not use blood thinner.  Patient is very active.  Walks 5 miles every day.    Results for orders placed or performed in visit on 06/15/21   Bladder Scan   Result Value Ref Range    Volume 0          PVR     2/21      50  12/20    120  12/20    408  10/19/20  Rezum  11/20  25  3/20    72    Previous    9/20 cystoscopy3 cm prostate, minor to moderate trabeculations, negative otherwise.    Flomax 0.4-  caused sever dizzeness.  Stopped this    Patient has had a CT several years ago that did show some nonobstructing stones.  He has never passed a kidney stone.    Patient has been worked up for gross hematuria before    2002 cystoscopy negative, also had some imaging at that time that was negative      3/20 Cr 0.74, GFR 11    PSA    11/21  2.89  6/20   4.38  3/20  7.32  2/20  7.38  6/19 3.83  5/18 2.85  3/16 2.26  1/13 1.63  12/12 1.87  10/06 3.1    Past History:  Medical History: has a past medical history of Bladder disorder, BPH (benign prostatic hyperplasia), Chronic allergic rhinitis, Kidney stones, Mood disorder (CMS/HCC), Prostate disorder, and UTI (urinary tract infection).   Surgical History: has a past surgical history that includes Wrist surgery and Other surgical history.   Family History: family history is not on file.   Social History: reports that he has never smoked. He does not have any smokeless tobacco history on file. He reports current alcohol use.  Allergies: Patient has no known allergies.       Current Outpatient Medications:   •  busPIRone (BUSPAR) 15 MG tablet, Take 15 mg by mouth 2 (Two) Times a Day.,  Disp: , Rfl:   •  citalopram (CeleXA) 20 MG tablet, Take 20 mg by mouth Daily., Disp: , Rfl:   •  LORazepam (ATIVAN) 1 MG tablet, Take 1 mg by mouth 3 (Three) Times a Day As Needed., Disp: , Rfl:   •  mometasone (ELOCON) 0.1 % cream, APPLY CREAM TOPICALLY TO AFFECTED AREA DAILY, Disp: , Rfl:   •  sildenafil (REVATIO) 20 MG tablet, sildenafil 20 mg Take 1 - 5 tablets PO prn 1 hour prior to sexual intercourse 12/22/2020  Active, Disp: , Rfl:      Physical exam       Alert and orient x3  Well appearing, well developed, in no acute distress   Unlabored respirations    Grossly oriented to person, place and time, judgment is intact, normal mood and affect    Results for orders placed or performed in visit on 06/15/21   Bladder Scan   Result Value Ref Range    Volume 0         Objective     Vital Signs:   There were no vitals taken for this visit.             Assessment and Plan    Diagnoses and all orders for this visit:    1. Benign prostatic hyperplasia with urinary frequency (Primary)    2. Erectile dysfunction, unspecified erectile dysfunction type      BPH    Voiding okay, no meds          ED    Sildenafil prn      Patient will follow up with his PCP for prostate cancer screening, back within normal limits.  Follow-up  as needed

## 2021-12-14 ENCOUNTER — OFFICE VISIT (OUTPATIENT)
Dept: UROLOGY | Facility: CLINIC | Age: 71
End: 2021-12-14

## 2021-12-14 VITALS — HEIGHT: 72 IN | BODY MASS INDEX: 24.19 KG/M2 | WEIGHT: 178.6 LBS

## 2021-12-14 DIAGNOSIS — R35.0 BENIGN PROSTATIC HYPERPLASIA WITH URINARY FREQUENCY: Primary | ICD-10-CM

## 2021-12-14 DIAGNOSIS — N52.9 ERECTILE DYSFUNCTION, UNSPECIFIED ERECTILE DYSFUNCTION TYPE: ICD-10-CM

## 2021-12-14 DIAGNOSIS — N40.1 BENIGN PROSTATIC HYPERPLASIA WITH URINARY FREQUENCY: Primary | ICD-10-CM

## 2021-12-14 PROCEDURE — 99212 OFFICE O/P EST SF 10 MIN: CPT | Performed by: UROLOGY

## 2021-12-14 RX ORDER — IBUPROFEN 200 MG
200 TABLET ORAL EVERY 8 HOURS PRN
COMMUNITY

## 2021-12-14 RX ORDER — MONTELUKAST SODIUM 10 MG/1
10 TABLET ORAL DAILY
COMMUNITY
Start: 2021-11-30

## 2021-12-14 RX ORDER — PRAVASTATIN SODIUM 20 MG
20 TABLET ORAL DAILY
COMMUNITY
Start: 2021-11-30

## 2021-12-14 RX ORDER — FLUTICASONE PROPIONATE 50 MCG
1 SPRAY, SUSPENSION (ML) NASAL DAILY
COMMUNITY
Start: 2021-11-01

## 2022-05-03 ENCOUNTER — TELEPHONE (OUTPATIENT)
Dept: UROLOGY | Facility: CLINIC | Age: 72
End: 2022-05-03

## 2022-05-03 NOTE — TELEPHONE ENCOUNTER
Caller: Pa Montano    Relationship to patient: Self    Best call back number: [phone] (HOME) THEN CELL [phone]    Patient is needing: PATIENT REQUESTING APPOINTMENT. HE STATES HE CAN'T HARDLY URINATE, HAS FREQUENCY AND URGENCY. HE STATES HE HAD A URINALYSIS DONE TODAY THAT SHOWED BLOOD IN HIS URINE.     HUB ATTEMPTED TO WARM TRANSFER BUT WAS UNABLE TO REACH PRACTICE.     PATIENT REQUESTING CALL BACK

## 2022-05-04 ENCOUNTER — OFFICE VISIT (OUTPATIENT)
Dept: UROLOGY | Facility: CLINIC | Age: 72
End: 2022-05-04

## 2022-05-04 VITALS — WEIGHT: 172 LBS | RESPIRATION RATE: 18 BRPM | HEART RATE: 65 BPM | BODY MASS INDEX: 23.3 KG/M2 | HEIGHT: 72 IN

## 2022-05-04 DIAGNOSIS — N40.1 URINARY RETENTION DUE TO BENIGN PROSTATIC HYPERPLASIA: ICD-10-CM

## 2022-05-04 DIAGNOSIS — N40.0 BENIGN PROSTATIC HYPERPLASIA, UNSPECIFIED WHETHER LOWER URINARY TRACT SYMPTOMS PRESENT: Primary | ICD-10-CM

## 2022-05-04 DIAGNOSIS — R33.8 URINARY RETENTION DUE TO BENIGN PROSTATIC HYPERPLASIA: ICD-10-CM

## 2022-05-04 LAB
BILIRUB BLD-MCNC: NEGATIVE MG/DL
CLARITY, POC: CLEAR
COLOR UR: YELLOW
EXPIRATION DATE: ABNORMAL
GLUCOSE UR STRIP-MCNC: NEGATIVE MG/DL
KETONES UR QL: NEGATIVE
LEUKOCYTE EST, POC: ABNORMAL
Lab: ABNORMAL
NITRITE UR-MCNC: NEGATIVE MG/ML
PH UR: 6 [PH] (ref 5–8)
PROT UR STRIP-MCNC: NEGATIVE MG/DL
RBC # UR STRIP: ABNORMAL /UL
SP GR UR: 1.02 (ref 1–1.03)
SPECIMEN VOL SMN: 148 ML
UROBILINOGEN UR QL: NORMAL

## 2022-05-04 PROCEDURE — 87086 URINE CULTURE/COLONY COUNT: CPT | Performed by: NURSE PRACTITIONER

## 2022-05-04 PROCEDURE — 51798 US URINE CAPACITY MEASURE: CPT | Performed by: NURSE PRACTITIONER

## 2022-05-04 PROCEDURE — 99213 OFFICE O/P EST LOW 20 MIN: CPT | Performed by: NURSE PRACTITIONER

## 2022-05-04 PROCEDURE — 81003 URINALYSIS AUTO W/O SCOPE: CPT | Performed by: NURSE PRACTITIONER

## 2022-05-04 RX ORDER — TAMSULOSIN HYDROCHLORIDE 0.4 MG/1
1 CAPSULE ORAL DAILY
Qty: 90 CAPSULE | Refills: 0 | Status: SHIPPED | OUTPATIENT
Start: 2022-05-04 | End: 2022-08-02

## 2022-05-04 RX ORDER — MELOXICAM 15 MG/1
15 TABLET ORAL DAILY
COMMUNITY
Start: 2022-04-21 | End: 2022-06-21

## 2022-05-05 LAB — BACTERIA SPEC AEROBE CULT: NO GROWTH

## 2022-05-05 NOTE — PROGRESS NOTES
Chief Complaint: Difficulty Urinating    Subjective      I am dribbling urine       History of Present Illness  Pa Montano is a 71 y.o. male presents to [facility] for troubling voiding.    Patient is a current patient of Dr. Lui Weir.     Patient admits to urinary frequency urgency, and dysuria.    Admits to hesitancy up on initiation of urine stream.    Patient reports that he is mainly dribbling when he voids.    Denies any gross hematuria.    Admits to nocturia 2X/night.    Patient reports that he has with a severe suprapubic pressure.    Previous psa's:  11/21  2.89  6/20   4.38  3/20  7.32  2/20  7.38  6/19 3.83  5/18 2.85  3/16 2.26  1/13 1.63  12/12 1.87  10/06 3.1     Patient has history of prostatitis.    9/20: cystoscopy - 3cm prostate, minor to moderate trabeculations, otherwise negative    2/20: Isai Crisostomo)    Results for orders placed or performed in visit on 05/04/22   Bladder Scan   Result Value Ref Range    Volume: 148    POC Urinalysis Dipstick, Automated    Specimen: Urine   Result Value Ref Range    Color Yellow Yellow, Straw, Dark Yellow, Misa    Clarity, UA Clear Clear    Specific Gravity  1.025 1.005 - 1.030    pH, Urine 6.0 5.0 - 8.0    Leukocytes Trace (A) Negative    Nitrite, UA Negative Negative    Protein, POC Negative Negative mg/dL    Glucose, UA Negative Negative, 1000 mg/dL (3+) mg/dL    Ketones, UA Negative Negative    Urobilinogen, UA Normal Normal    Bilirubin Negative Negative    Blood, UA Small (A) Negative    Lot Number [id number]     Expiration Date 2,023-2          Objective     Past Medical History:   Diagnosis Date   • Bladder disorder    • BPH (benign prostatic hyperplasia)    • Chronic allergic rhinitis    • Kidney stones    • Mood disorder (HCC)    • Prostate disorder    • UTI (urinary tract infection)        Past Surgical History:   Procedure Laterality Date   • OTHER SURGICAL HISTORY      Rezum Procedure   • WRIST SURGERY    "      Outpatient Medications Marked as Taking for the 5/4/22 encounter (Office Visit) with Kay Herrera APRN   Medication Sig Dispense Refill   • busPIRone (BUSPAR) 15 MG tablet Take 15 mg by mouth 2 (Two) Times a Day.     • Cholecalciferol 50 MCG (2000 UT) tablet Take 2,000 Units by mouth Daily.     • citalopram (CeleXA) 20 MG tablet Take 20 mg by mouth Daily.     • fluticasone (FLONASE) 50 MCG/ACT nasal spray 1 spray.     • ibuprofen (ADVIL,MOTRIN) 200 MG tablet Take 200 mg by mouth.     • LORazepam (ATIVAN) 1 MG tablet Take 1 mg by mouth 3 (Three) Times a Day As Needed.     • meloxicam (MOBIC) 15 MG tablet Take 15 mg by mouth Daily.     • mometasone (ELOCON) 0.1 % cream APPLY CREAM TOPICALLY TO AFFECTED AREA DAILY     • montelukast (SINGULAIR) 10 MG tablet Take 10 mg by mouth Daily.     • pravastatin (PRAVACHOL) 20 MG tablet Take 20 mg by mouth Daily.         No Known Allergies     History reviewed. No pertinent family history.    Social History     Socioeconomic History   • Marital status:    Tobacco Use   • Smoking status: Never Smoker   • Smokeless tobacco: Never Used   Vaping Use   • Vaping Use: Never used   Substance and Sexual Activity   • Alcohol use: Yes     Comment: Current Some Day   • Drug use: Never   • Sexual activity: Defer       Review of Systems   Constitutional: Negative for chills and fever.   Genitourinary: Positive for decreased urine volume, difficulty urinating, dysuria, frequency, nocturia and urgency. Negative for discharge, flank pain, genital sores, hematuria, penile pain, erectile dysfunction, penile swelling, scrotal swelling, testicular pain and urinary incontinence.        Vital Signs:   Pulse 65   Resp 18   Ht 182.9 cm (72\")   Wt 78 kg (172 lb)   BMI 23.33 kg/m²      Physical Exam  Constitutional:       Appearance: Normal appearance.   HENT:      Head: Normocephalic.   Cardiovascular:      Rate and Rhythm: Normal rate.   Pulmonary:      Effort: Pulmonary effort is " normal.   Abdominal:      General: Abdomen is flat.      Palpations: Abdomen is soft.   Genitourinary:     Comments: Bladder Scan interpretation     Estimation of residual urine via [employer] 3000 Verathon Bladder Scan  MA/nurse performing: Crystal - CMA  Residual Urine: 148 ml  Indication: Benign prostatic hyperplasia, unspecified whether lower urinary tract symptoms present  (primary encounter diagnosis)   Position: Supine  Examination: Incremental scanning of the suprapubic area using 2.0 MHz transducer using copious amounts of acoustic gel.   Findings: An anechoic area was demonstrated which represented the bladder, with measurement of residual urine as noted. I inspected this myself. In that the residual urine was stable , refer to plan for treatment and plan necessary at this time.         Skin:     General: Skin is warm and dry.   Neurological:      General: No focal deficit present.      Mental Status: He is alert and oriented to person, place, and time.   Psychiatric:         Mood and Affect: Mood normal.         Thought Content: Thought content normal.          Result Review :          [x]  Laboratory  []  Radiology  []  Pathology  []  Microbiology  []  EKG/Telemetry   []  Cardiology/Vascular   [x]  Old records  Today I have reviewed Dr. Weir previous office note and previous psa levels     Assessment and Plan    Diagnoses and all orders for this visit:    1. Benign prostatic hyperplasia, unspecified whether lower urinary tract symptoms present (Primary)  -     POC Urinalysis Dipstick, Automated  -     Bladder Scan  -     tamsulosin (FLOMAX) 0.4 MG capsule 24 hr capsule; Take 1 capsule by mouth Daily for 90 days.  Dispense: 90 capsule; Refill: 0  -     Urine Culture - Urine, Urine, Clean Catch    2. Urinary retention due to benign prostatic hyperplasia        Follow Up   Return for Follow-up with me in 2 weeks for a PVR check.     Start flomax 0.4 mg tab; side effects discussed; aware that it may take 3-4  months to have complete effect so encouraged patient to continue to take to ensure adequate trial of medication.    Patient was given instructions and counseling regarding his condition or for health maintenance advice. Please see specific information pulled into the AVS if appropriate.

## 2022-05-06 ENCOUNTER — TELEPHONE (OUTPATIENT)
Dept: SURGERY | Facility: CLINIC | Age: 72
End: 2022-05-06

## 2022-05-06 NOTE — TELEPHONE ENCOUNTER
Patient called back, he said that [facility] in [place], [place] does have Epic. So they will not need records faxed.

## 2022-05-06 NOTE — TELEPHONE ENCOUNTER
I called the patient and, per Enmanuel, told him that they do not send straight cath home with patients.  I told him if he cannot urinate, he needs to go to ER.  He voiced understanding.  I told him that since April or Dr. Weir didn't advise or refer for Urolift, he will need to contact PCP for referral.

## 2022-05-06 NOTE — TELEPHONE ENCOUNTER
Patient called back and asked for his records to be faxed to [facility] in [place], [place], for his appointment.  He only has their phone number, which is [phone].  When asked if Dr. Weir referred, he said he didn't, but wants him to do so.  However, he doesn't know if he needs a referral.    He stated he is going there, as Dr. Weir doesn't do Urolift.  He said he had done Rezum.    Patient said he is having sever prostate issue and is desperate for relief.  Dribbling urine.  He said he saw April when Dr. Weir was out of office, and was having same issue, and said she started him on Flomax and told him to go to ER if he couldn't urinate for a 12-hour period.    He stated that he asked for straight cath to take home, but wasn't given one.    Please call him, as I do not know if he needs to be seen for catheter today.

## 2022-05-14 ENCOUNTER — APPOINTMENT (OUTPATIENT)
Dept: GENERAL RADIOLOGY | Facility: HOSPITAL | Age: 72
End: 2022-05-14

## 2022-05-14 ENCOUNTER — ANESTHESIA (OUTPATIENT)
Dept: PERIOP | Facility: HOSPITAL | Age: 72
End: 2022-05-14

## 2022-05-14 ENCOUNTER — HOSPITAL ENCOUNTER (OUTPATIENT)
Facility: HOSPITAL | Age: 72
Discharge: HOME | End: 2022-05-14
Attending: UROLOGY | Admitting: UROLOGY

## 2022-05-14 ENCOUNTER — ANESTHESIA EVENT (OUTPATIENT)
Dept: PERIOP | Facility: HOSPITAL | Age: 72
End: 2022-05-14

## 2022-05-14 VITALS
OXYGEN SATURATION: 100 % | DIASTOLIC BLOOD PRESSURE: 65 MMHG | HEART RATE: 65 BPM | TEMPERATURE: 98.4 F | HEIGHT: 71 IN | SYSTOLIC BLOOD PRESSURE: 140 MMHG | BODY MASS INDEX: 24.54 KG/M2 | WEIGHT: 175.27 LBS | RESPIRATION RATE: 12 BRPM

## 2022-05-14 DIAGNOSIS — R97.20 ELEVATED PSA: ICD-10-CM

## 2022-05-14 DIAGNOSIS — R33.9 URINARY RETENTION: Primary | ICD-10-CM

## 2022-05-14 PROCEDURE — 74018 RADEX ABDOMEN 1 VIEW: CPT

## 2022-05-14 PROCEDURE — 82365 CALCULUS SPECTROSCOPY: CPT | Performed by: UROLOGY

## 2022-05-14 PROCEDURE — 88300 SURGICAL PATH GROSS: CPT | Performed by: UROLOGY

## 2022-05-14 PROCEDURE — 76000 FLUOROSCOPY <1 HR PHYS/QHP: CPT

## 2022-05-14 PROCEDURE — 52310 CYSTOSCOPY AND TREATMENT: CPT | Performed by: UROLOGY

## 2022-05-14 PROCEDURE — 25010000002 DEXAMETHASONE PER 1 MG: Performed by: NURSE ANESTHETIST, CERTIFIED REGISTERED

## 2022-05-14 PROCEDURE — 99218 PR INITIAL OBSERVATION CARE/DAY 30 MINUTES: CPT | Performed by: UROLOGY

## 2022-05-14 PROCEDURE — G0378 HOSPITAL OBSERVATION PER HR: HCPCS

## 2022-05-14 PROCEDURE — 25010000002 KETOROLAC TROMETHAMINE PER 15 MG: Performed by: NURSE ANESTHETIST, CERTIFIED REGISTERED

## 2022-05-14 PROCEDURE — 25010000002 LEVOFLOXACIN PER 250 MG: Performed by: UROLOGY

## 2022-05-14 PROCEDURE — C1769 GUIDE WIRE: HCPCS | Performed by: UROLOGY

## 2022-05-14 PROCEDURE — 25010000002 MIDAZOLAM PER 1 MG: Performed by: NURSE ANESTHETIST, CERTIFIED REGISTERED

## 2022-05-14 PROCEDURE — 25010000002 FENTANYL CITRATE (PF) 50 MCG/ML SOLUTION: Performed by: NURSE ANESTHETIST, CERTIFIED REGISTERED

## 2022-05-14 PROCEDURE — 25010000002 PROPOFOL 10 MG/ML EMULSION: Performed by: NURSE ANESTHETIST, CERTIFIED REGISTERED

## 2022-05-14 PROCEDURE — G0379 DIRECT REFER HOSPITAL OBSERV: HCPCS

## 2022-05-14 PROCEDURE — 25010000002 ONDANSETRON PER 1 MG: Performed by: NURSE ANESTHETIST, CERTIFIED REGISTERED

## 2022-05-14 RX ORDER — SODIUM CHLORIDE 9 MG/ML
70 INJECTION, SOLUTION INTRAVENOUS CONTINUOUS
Status: DISCONTINUED | OUTPATIENT
Start: 2022-05-14 | End: 2022-05-14 | Stop reason: HOSPADM

## 2022-05-14 RX ORDER — PROMETHAZINE HYDROCHLORIDE 12.5 MG/1
25 TABLET ORAL ONCE AS NEEDED
Status: DISCONTINUED | OUTPATIENT
Start: 2022-05-14 | End: 2022-05-14 | Stop reason: HOSPADM

## 2022-05-14 RX ORDER — ONDANSETRON 2 MG/ML
INJECTION INTRAMUSCULAR; INTRAVENOUS AS NEEDED
Status: DISCONTINUED | OUTPATIENT
Start: 2022-05-14 | End: 2022-05-14 | Stop reason: SURG

## 2022-05-14 RX ORDER — OXYCODONE HYDROCHLORIDE 5 MG/1
5 TABLET ORAL EVERY 4 HOURS PRN
Status: DISCONTINUED | OUTPATIENT
Start: 2022-05-14 | End: 2022-05-14 | Stop reason: HOSPADM

## 2022-05-14 RX ORDER — SODIUM CHLORIDE, SODIUM LACTATE, POTASSIUM CHLORIDE, CALCIUM CHLORIDE 600; 310; 30; 20 MG/100ML; MG/100ML; MG/100ML; MG/100ML
9 INJECTION, SOLUTION INTRAVENOUS CONTINUOUS PRN
Status: DISCONTINUED | OUTPATIENT
Start: 2022-05-14 | End: 2022-05-14 | Stop reason: HOSPADM

## 2022-05-14 RX ORDER — GLYCOPYRROLATE 0.2 MG/ML
INJECTION INTRAMUSCULAR; INTRAVENOUS AS NEEDED
Status: DISCONTINUED | OUTPATIENT
Start: 2022-05-14 | End: 2022-05-14 | Stop reason: SURG

## 2022-05-14 RX ORDER — HYDROCODONE BITARTRATE AND ACETAMINOPHEN 5; 325 MG/1; MG/1
1 TABLET ORAL EVERY 6 HOURS PRN
Qty: 15 TABLET | Refills: 0 | Status: SHIPPED | OUTPATIENT
Start: 2022-05-14 | End: 2023-01-17

## 2022-05-14 RX ORDER — DEXAMETHASONE SODIUM PHOSPHATE 4 MG/ML
INJECTION, SOLUTION INTRA-ARTICULAR; INTRALESIONAL; INTRAMUSCULAR; INTRAVENOUS; SOFT TISSUE AS NEEDED
Status: DISCONTINUED | OUTPATIENT
Start: 2022-05-14 | End: 2022-05-14 | Stop reason: SURG

## 2022-05-14 RX ORDER — ONDANSETRON 4 MG/1
4 TABLET, FILM COATED ORAL EVERY 6 HOURS PRN
Status: DISCONTINUED | OUTPATIENT
Start: 2022-05-14 | End: 2022-05-14 | Stop reason: HOSPADM

## 2022-05-14 RX ORDER — ROCURONIUM BROMIDE 10 MG/ML
INJECTION, SOLUTION INTRAVENOUS AS NEEDED
Status: DISCONTINUED | OUTPATIENT
Start: 2022-05-14 | End: 2022-05-14 | Stop reason: SURG

## 2022-05-14 RX ORDER — NALOXONE HCL 0.4 MG/ML
0.4 VIAL (ML) INJECTION
Status: DISCONTINUED | OUTPATIENT
Start: 2022-05-14 | End: 2022-05-14 | Stop reason: HOSPADM

## 2022-05-14 RX ORDER — MAGNESIUM HYDROXIDE 1200 MG/15ML
LIQUID ORAL AS NEEDED
Status: DISCONTINUED | OUTPATIENT
Start: 2022-05-14 | End: 2022-05-14 | Stop reason: HOSPADM

## 2022-05-14 RX ORDER — PROMETHAZINE HYDROCHLORIDE 12.5 MG/1
12.5 TABLET ORAL EVERY 6 HOURS PRN
Status: DISCONTINUED | OUTPATIENT
Start: 2022-05-14 | End: 2022-05-14 | Stop reason: HOSPADM

## 2022-05-14 RX ORDER — SODIUM CHLORIDE 0.9 % (FLUSH) 0.9 %
10 SYRINGE (ML) INJECTION EVERY 12 HOURS SCHEDULED
Status: DISCONTINUED | OUTPATIENT
Start: 2022-05-14 | End: 2022-05-14 | Stop reason: HOSPADM

## 2022-05-14 RX ORDER — MEPERIDINE HYDROCHLORIDE 25 MG/ML
12.5 INJECTION INTRAMUSCULAR; INTRAVENOUS; SUBCUTANEOUS
Status: DISCONTINUED | OUTPATIENT
Start: 2022-05-14 | End: 2022-05-14 | Stop reason: HOSPADM

## 2022-05-14 RX ORDER — ONDANSETRON 2 MG/ML
4 INJECTION INTRAMUSCULAR; INTRAVENOUS ONCE AS NEEDED
Status: DISCONTINUED | OUTPATIENT
Start: 2022-05-14 | End: 2022-05-14 | Stop reason: HOSPADM

## 2022-05-14 RX ORDER — KETOROLAC TROMETHAMINE 30 MG/ML
INJECTION, SOLUTION INTRAMUSCULAR; INTRAVENOUS AS NEEDED
Status: DISCONTINUED | OUTPATIENT
Start: 2022-05-14 | End: 2022-05-14 | Stop reason: SURG

## 2022-05-14 RX ORDER — SODIUM CHLORIDE 0.9 % (FLUSH) 0.9 %
10 SYRINGE (ML) INJECTION AS NEEDED
Status: DISCONTINUED | OUTPATIENT
Start: 2022-05-14 | End: 2022-05-14 | Stop reason: HOSPADM

## 2022-05-14 RX ORDER — LIDOCAINE HYDROCHLORIDE 20 MG/ML
INJECTION, SOLUTION EPIDURAL; INFILTRATION; INTRACAUDAL; PERINEURAL AS NEEDED
Status: DISCONTINUED | OUTPATIENT
Start: 2022-05-14 | End: 2022-05-14 | Stop reason: SURG

## 2022-05-14 RX ORDER — MIDAZOLAM HYDROCHLORIDE 1 MG/ML
INJECTION INTRAMUSCULAR; INTRAVENOUS AS NEEDED
Status: DISCONTINUED | OUTPATIENT
Start: 2022-05-14 | End: 2022-05-14 | Stop reason: SURG

## 2022-05-14 RX ORDER — PROPOFOL 10 MG/ML
VIAL (ML) INTRAVENOUS AS NEEDED
Status: DISCONTINUED | OUTPATIENT
Start: 2022-05-14 | End: 2022-05-14 | Stop reason: SURG

## 2022-05-14 RX ORDER — PROMETHAZINE HYDROCHLORIDE 25 MG/1
25 SUPPOSITORY RECTAL ONCE AS NEEDED
Status: DISCONTINUED | OUTPATIENT
Start: 2022-05-14 | End: 2022-05-14 | Stop reason: HOSPADM

## 2022-05-14 RX ORDER — LEVOFLOXACIN 5 MG/ML
500 INJECTION, SOLUTION INTRAVENOUS
Status: COMPLETED | OUTPATIENT
Start: 2022-05-14 | End: 2022-05-14

## 2022-05-14 RX ORDER — ONDANSETRON 2 MG/ML
4 INJECTION INTRAMUSCULAR; INTRAVENOUS EVERY 6 HOURS PRN
Status: DISCONTINUED | OUTPATIENT
Start: 2022-05-14 | End: 2022-05-14 | Stop reason: HOSPADM

## 2022-05-14 RX ORDER — DOCUSATE SODIUM 100 MG/1
100 CAPSULE, LIQUID FILLED ORAL 2 TIMES DAILY PRN
Status: DISCONTINUED | OUTPATIENT
Start: 2022-05-14 | End: 2022-05-14 | Stop reason: HOSPADM

## 2022-05-14 RX ORDER — FENTANYL CITRATE 50 UG/ML
INJECTION, SOLUTION INTRAMUSCULAR; INTRAVENOUS AS NEEDED
Status: DISCONTINUED | OUTPATIENT
Start: 2022-05-14 | End: 2022-05-14 | Stop reason: SURG

## 2022-05-14 RX ORDER — MORPHINE SULFATE 2 MG/ML
2 INJECTION, SOLUTION INTRAMUSCULAR; INTRAVENOUS
Status: DISCONTINUED | OUTPATIENT
Start: 2022-05-14 | End: 2022-05-14 | Stop reason: HOSPADM

## 2022-05-14 RX ORDER — OXYCODONE HYDROCHLORIDE 5 MG/1
5 TABLET ORAL
Status: DISCONTINUED | OUTPATIENT
Start: 2022-05-14 | End: 2022-05-14 | Stop reason: HOSPADM

## 2022-05-14 RX ADMIN — ONDANSETRON 4 MG: 2 INJECTION INTRAMUSCULAR; INTRAVENOUS at 16:57

## 2022-05-14 RX ADMIN — FENTANYL CITRATE 50 MCG: 50 INJECTION, SOLUTION INTRAMUSCULAR; INTRAVENOUS at 16:50

## 2022-05-14 RX ADMIN — PROPOFOL 150 MG: 10 INJECTION, EMULSION INTRAVENOUS at 16:36

## 2022-05-14 RX ADMIN — GLYCOPYRROLATE 0.2 MG: 0.2 INJECTION INTRAMUSCULAR; INTRAVENOUS at 16:28

## 2022-05-14 RX ADMIN — OXYCODONE HYDROCHLORIDE 5 MG: 5 TABLET ORAL at 17:34

## 2022-05-14 RX ADMIN — SUGAMMADEX 200 MG: 100 INJECTION, SOLUTION INTRAVENOUS at 17:00

## 2022-05-14 RX ADMIN — SODIUM CHLORIDE, POTASSIUM CHLORIDE, SODIUM LACTATE AND CALCIUM CHLORIDE: 600; 310; 30; 20 INJECTION, SOLUTION INTRAVENOUS at 16:27

## 2022-05-14 RX ADMIN — FENTANYL CITRATE 50 MCG: 50 INJECTION, SOLUTION INTRAMUSCULAR; INTRAVENOUS at 16:36

## 2022-05-14 RX ADMIN — LIDOCAINE HYDROCHLORIDE 100 MG: 20 INJECTION, SOLUTION EPIDURAL; INFILTRATION; INTRACAUDAL; PERINEURAL at 16:36

## 2022-05-14 RX ADMIN — KETOROLAC TROMETHAMINE 15 MG: 30 INJECTION, SOLUTION INTRAMUSCULAR; INTRAVENOUS at 17:00

## 2022-05-14 RX ADMIN — DEXAMETHASONE SODIUM PHOSPHATE 8 MG: 4 INJECTION, SOLUTION INTRA-ARTICULAR; INTRALESIONAL; INTRAMUSCULAR; INTRAVENOUS; SOFT TISSUE at 16:40

## 2022-05-14 RX ADMIN — LEVOFLOXACIN 500 MG: 500 INJECTION, SOLUTION INTRAVENOUS at 16:40

## 2022-05-14 RX ADMIN — ROCURONIUM BROMIDE 50 MG: 10 INJECTION INTRAVENOUS at 16:37

## 2022-05-14 RX ADMIN — MIDAZOLAM HYDROCHLORIDE 2 MG: 1 INJECTION, SOLUTION INTRAMUSCULAR; INTRAVENOUS at 16:28

## 2022-05-14 NOTE — PLAN OF CARE
Goal Outcome Evaluation:  Plan of Care Reviewed With: patient, spouse        Progress: no change  Outcome Evaluation: obs admit, to surgery immediately for urinary retention/obstruction. no new issues/needs noted at this time.

## 2022-05-14 NOTE — ANESTHESIA POSTPROCEDURE EVALUATION
Patient: Pa Montano    Procedure Summary     Date: 05/14/22 Room / Location: [facility] OR 07 / [facility] MAIN OR    Anesthesia Start: 1630 Anesthesia Stop: 1712    Procedure: CYSTOSCOPY AMD BASKET REMOVAL OF BLADDER STONE AND HEATON CATHETER INSERTION (N/A Ureter) Diagnosis:       Urinary retention      (Urinary retention [R33.9])    Surgeons: Lui Weir MD Provider: Izabella Cohen DO    Anesthesia Type: general ASA Status: 2 - Emergent          Anesthesia Type: general    Vitals  Vitals Value Taken Time   /74 05/14/22 1750   Temp 36.4 °C (97.5 °F) 05/14/22 1710   Pulse 70 05/14/22 1753   Resp 16 05/14/22 1725   SpO2 91 % 05/14/22 1753   Vitals shown include unvalidated device data.        Post Anesthesia Care and Evaluation    Patient location during evaluation: bedside  Patient participation: complete - patient participated  Level of consciousness: awake  Pain management: adequate  Airway patency: patent  Anesthetic complications: No anesthetic complications  PONV Status: none  Cardiovascular status: acceptable and stable  Respiratory status: acceptable  Hydration status: acceptable    Comments: An Anesthesiologist personally participated in the most demanding procedures (including induction and emergence if applicable) in the anesthesia plan, monitored the course of anesthesia administration at frequent intervals and remained physically present and available for immediate diagnosis and treatment of emergencies.

## 2022-05-14 NOTE — H&P
[facility]   UROLOGY HISTORY AND PHYSICAL    Patient Name: Pa Montano  : 1950  MRN: [id number]  Primary Care Physician:  Edgar Zurita, JEWELS  Date of admission: (Not on file)    Subjective   Subjective     Chief Complaint:   Urinary retention    HPI:    71-year-old M    BPH - status post Rezum in , ED  ED  Urinary retention- urethral stone      Transferred from [place], seen in the ER they could not place catheter.    2022 CT abdomen/pelvis without - [place] - 14 x 7 mm bulbar urethral stone, 50 g prostate.  Left kidney has a 3 mm nonobstructing stone, right kidney has punctate stones.  Negative otherwise.    2022 saw nurse practitioner started Flomax       history of prostatitis    no prostate meds.     Sildenafil 20 mg-does help, can cause headache          No cardiopulmonary history.  Patient does not smoke.  Patient does not use blood thinner.  Patient is very active.  Walks 5 miles every day.           Results for orders placed or performed in visit on 06/15/21   Bladder Scan   Result Value Ref Range     Volume 0             PVR          150        50      120      408  10/19/20  Rezum    25  3/20    71    Previous     cystoscopy3 cm prostate, minor to moderate trabeculations, negative otherwise.    Flomax 0.4-  caused sever dizzeness.  Stopped this       cystoscopy negative, also had some imaging at that time that was negative      3/20 Cr 0.74, GFR 11    PSA       2.89     4.38  3/20  7.32    7.38   3.83   2.85  3/16 2.26   1.63   1.87  10/06 3.1         Review of Systems     10 systems reviewed and are negative other than what is listed in HPI    Personal History     Past Medical History:   Diagnosis Date   • Bladder disorder    • BPH (benign prostatic hyperplasia)    • Chronic allergic rhinitis    • Kidney stones    • Mood disorder (HCC)    • Prostate disorder    • UTI (urinary tract infection)        Past  Surgical History:   Procedure Laterality Date   • OTHER SURGICAL HISTORY      Rezum Procedure   • WRIST SURGERY         Family History: family history is not on file. Otherwise pertinent FHx was reviewed and not pertinent to current issue.    Social History:  reports that he has never smoked. He has never used smokeless tobacco. He reports current alcohol use. He reports that he does not use drugs.    Home Medications:  Cholecalciferol, LORazepam, busPIRone, citalopram, fluticasone, ibuprofen, meloxicam, mometasone, montelukast, pravastatin, sildenafil, and tamsulosin      Allergies:  No Known Allergies    Objective   Objective     Vitals:      Physical Exam    Constitutional: Awake, alert    Respiratory: Clear to auscultation bilaterally, nonlabored respirations    Cardiovascular: RRR, no murmurs, rubs, or gallops, palpable pedal pulses bilaterally   Gastrointestinal: Positive bowel sounds, soft, nontender, nondistended    Skin: No rashes     Result Review    Result Review:  I have personally reviewed the results from the time of this admission to 5/14/2022 13:55 EDT and agree with these findings:  []  Laboratory  []  Microbiology  []  Radiology  []  EKG/Telemetry   []  Cardiology/Vascular   []  Pathology  []  Old records  []  Other:    Assessment & Plan   Assessment / Plan     Brief Patient Summary:  Pa Montano is a 71 y.o. male     Active Hospital Problems:  Active Hospital Problems    Diagnosis    • Urinary retention        Plan:       Records reviewed today and summarized in chart    CT images and read reviewed from [place]      Patient with a what looks to be bulbar urethral stone.  Patient only able to void just dribbles at a time.  Starting to feel pressure.  Catheter cannot be placed.      Take to the OR emergently for cystoscopy litholapaxy, laser.  Risks and benefits were discussed including bleeding, infection and damage to the urinary system.  We also discussed the risk of anesthesia up to and  including death.  Patient voiced understanding and would like to proceed.                  Electronically signed by Lui Weir MD, 05/14/22, 1:55 PM EDT.

## 2022-05-14 NOTE — DISCHARGE INSTR - APPOINTMENTS
Per Dr. Weir, patient does not have to followup with his office unless they need something. If everything is doing okay/good, they can cancel appointment with his nurse practitioner.

## 2022-05-14 NOTE — ANESTHESIA PREPROCEDURE EVALUATION
Anesthesia Evaluation     Patient summary reviewed and Nursing notes reviewed   no history of anesthetic complications:  NPO Solid Status: > 8 hours  NPO Liquid Status: > 8 hours           Airway   Mallampati: I  TM distance: >3 FB  Neck ROM: full  No difficulty expected  Dental - normal exam         Pulmonary - negative pulmonary ROS and normal exam   Cardiovascular - normal exam    (+) hyperlipidemia,       Neuro/Psych  (+) psychiatric history Anxiety,    GI/Hepatic/Renal/Endo    (+)   renal disease stones,     Musculoskeletal (-) negative ROS    Abdominal  - normal exam   Substance History - negative use     OB/GYN negative ob/gyn ROS         Other - negative ROS       ROS/Med Hx Other: Patient has a bulbar urethral stone urinary retention.                Anesthesia Plan    ASA 2 - emergent     general   (Patient understands anesthesia not responsible for dental damage.)  intravenous induction     Anesthetic plan, all risks, benefits, and alternatives have been provided, discussed and informed consent has been obtained with: patient.    Plan discussed with CRNA.        CODE STATUS:

## 2022-05-14 NOTE — OP NOTE
CYSTOLITHOLAPAXY WITH LASER  Procedure Report    Patient Name:  Pa Montano  YOB: 1950    Date of Surgery:  5/14/2022      Pre-op Diagnosis:   Urinary retention [R33.9]       Postop diagnosis:    Same    Procedure/CPT® Codes:      Procedure(s):  CYSTOSCOPY AMD BASKET REMOVAL OF BLADDER STONEs  Urethral dilation  HEATON CATHETER INSERTION     Staff:  Surgeon(s):  Lui Weir MD         Anesthesia: General    Estimated Blood Loss: 3 mL    Implants:    Nothing was implanted during the procedure    Specimen:          Specimens     ID Source Type Tests Collected By Collected At [facility]?    A Urinary Bladder Calculus · TISSUE PATHOLOGY EXAM  · STONE ANALYSIS   Lui Weir MD 5/14/22 [id number]     Description: BLADDER STONE    This specimen was not marked as sent.              Findings:     3 cm prostate still with open TUR defect from [facility].  Lobes were just barely coapting.  There was some undermining from the Rezum defect of the prostatic urethra.  Catheter would not go through at the end so I had to place catheter over wire    Patient had 3 urethral stones  8 mm, 9 mm and 1 cm  .  These were flushed into the bladder and removed with a no tip nitinol basket    All stones removed            Complications: none    Description of Procedure: Brief description of procedure    After informed consent patient was taken to the operating room.  Patient was placed supine and placed under general anesthesia by the anesthesia team.  Patient was prepped and draped in normal sterile fashion after being placed in dorsal lithotomy position.  A multidisciplinary timeout was undertaken documenting the correct patient site and procedure.    At this point tried to place a 22 rigid scope into the urethra but it would not go.  The meatus was too tight.  I did identify the stones were in the distal penile urethra as they were easily palpable.  I could not get this to pass with palpation.      I went ahead and  dilated the meatus to 22 Nigerian and took a 21 rigid cystoscope into the urethra.    Stones at this point has flush back to the prostatic urethra.  I tried to remove these with a grasper but they would not come out.  I went ahead and got a no tip nitinol basket at this point they did flush back into the bladder there were 3 separate stones    These were removed with a basket in their entirety.    Bladder was normal.  Bilateral ureteral orfices in normal anatomic location.  No other stones      18 [place] catheter placed over wire as I cannot get a catheter passed regularly because of the undermining    Catheter placed to straight drainage.  .  Patient tolerated the procedure well and was taken to the post anesthesia area without issue.          Lui Weir MD     Date: 5/14/2022  Time: 17:05 EDT

## 2022-05-15 NOTE — DISCHARGE SUMMARY
[facility]         DISCHARGE SUMMARY    Patient Name: Pa Montano  : 1950  MRN: [id number]    Date of Admission: 2022  Date of Discharge:  05/15/22   Primary Care Physician: Edgar Zurita, JEWELS    Consults     No orders found for last 30 day(s).          Surgical Procedures Since Admission:  Procedure(s):  CYSTOSCOPY AMD BASKET REMOVAL OF BLADDER STONE AND HEATON CATHETER INSERTION  Surgeon:  Lui Weir MD  Status:  1 Day Post-Op  -------------------      Presenting Problem:   Urinary retention [R33.9]    Active and Resolved Hospital Problems:  Active Hospital Problems   No active problems to display.      Resolved Hospital Problems    Diagnosis POA   • Urinary retention [R33.9] Yes         Hospital Course     Hospital Course:  Pa Montano is a 71 y.o. male     Admitted, taken to the OR, stones removed catheter placed discharged home    Day of Discharge     Vital Signs:  Temp:  [97.5 °F (36.4 °C)-98.4 °F (36.9 °C)] 98.4 °F (36.9 °C)  Heart Rate:  [63-84] 65  Resp:  [12-18] 12  BP: ()/() 140/65  Flow (L/min):  [2] 2  Output by Drain (mL) 22 0701 - 22 1900 22 1901 - 05/15/22 0700 05/15/22 0701 - 05/15/22 0927 Range Total   Urethral Catheter Latex 18 Fr. 400   400         Pertinent  and/or Most Recent Results     LAB RESULTS:                  Brief Urine Lab Results  (Last result in the past 365 days)      Color   Clarity   Blood   Leuk Est   Nitrite   Protein   CREAT   Urine HCG        22 1316 Yellow   Clear   Small   Trace   Negative   Negative               Microbiology Results (last 10 days)     ** No results found for the last 240 hours. **      CT Abdomen Pelvis Without Contrast    Result Date: 2022  Impression: Bilateral nonobstructing renal stones. 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 Electronically Signed: Britton Mendoza MD 2022/04/21 at 17:04 CDT Reading Location ID and State: [address] / [place] Tel [phone], Service support  [phone], Fax [phone]    XR Abdomen 1 View    Result Date: 5/14/2022  Impression:   1. Single fluoroscopic image during cystoscopy     KIM URBINA MD       Electronically Signed and Approved By: KIM URBINA MD on 5/14/2022 at 17:35               Labs Pending at Discharge:  Pending Labs     Order Current Status    STONE ANALYSIS - Calculus, Urinary Bladder Collected (05/14/22 1703)    Tissue Pathology Exam Collected (05/14/22 1703)          Discharge Details        Discharge Medications      New Medications      Instructions Start Date   HYDROcodone-acetaminophen 5-325 MG per tablet  Commonly known as: NORCO   1 tablet, Oral, Every 6 Hours PRN         Continue These Medications      Instructions Start Date   busPIRone 15 MG tablet  Commonly known as: BUSPAR   15 mg, Oral, 2 Times Daily      Cholecalciferol 50 MCG (2000 UT) tablet   2,000 Units, Oral, Daily      citalopram 20 MG tablet  Commonly known as: CeleXA   20 mg, Oral, Daily      fluticasone 50 MCG/ACT nasal spray  Commonly known as: FLONASE   1 spray, Nasal, Daily      ibuprofen 200 MG tablet  Commonly known as: ADVIL,MOTRIN   200 mg, Oral, Every 8 Hours PRN      LORazepam 1 MG tablet  Commonly known as: ATIVAN   1 mg, Oral, 3 Times Daily PRN      meloxicam 15 MG tablet  Commonly known as: MOBIC   15 mg, Oral, Daily      montelukast 10 MG tablet  Commonly known as: SINGULAIR   10 mg, Oral, Daily      pravastatin 20 MG tablet  Commonly known as: PRAVACHOL   20 mg, Oral, Daily      tamsulosin 0.4 MG capsule 24 hr capsule  Commonly known as: FLOMAX   0.4 mg, Oral, Daily             Allergies   Allergen Reactions   • Insect Extract Allergy Skin Test  Other (See Comments)     Discharged from  d/t anyphylactic response to being bitten by ants/anthill (fire ants)       Condition:    Good, stable    Physical exam:    Constitutional: Well-appearing, well-developed,  in no acute distress    Respiratory: Unlabored breathing    Abdominal: Nontender, nondistended, no rigidity or guarding, no hepatosplenomegaly    Genitourinary:     Bladder nonpalpable     Neurologic: Grossly oriented to person, place and time, judgment and insight intact, normal mood and affect      Catheter in place draining clear/yellow urine    Discharge Disposition:  Home or Self Care    Diet:  Hospital:No active diet order      Discharge Activity:   Activity Instructions    Increase activity as tolerated. Remove catheter tomorrow Sunday, 5/15/22.           Future Appointments   Date Time Provider Department Center   5/18/2022  2:15 PM Javier, April, [facility]ILL[facility]           Follow-up as needed

## 2022-05-19 LAB
LAB AP CASE REPORT: NORMAL
LAB AP CLINICAL INFORMATION: NORMAL
PATH REPORT.FINAL DX SPEC: NORMAL
PATH REPORT.GROSS SPEC: NORMAL

## 2022-05-24 LAB
COLOR STONE: NORMAL
COMPN STONE: NORMAL
HYDROXYAPATITE 24H ENGDIFF UR: 100 %
LABORATORY COMMENT REPORT: NORMAL
Lab: NORMAL
Lab: NORMAL
PHOTO: NORMAL
SIZE STONE: NORMAL MM
SPEC SOURCE SUBJ: NORMAL
WT STONE: 904 MG

## 2022-06-16 ENCOUNTER — TELEPHONE (OUTPATIENT)
Dept: UROLOGY | Facility: CLINIC | Age: 72
End: 2022-06-16

## 2022-06-16 NOTE — TELEPHONE ENCOUNTER
Pt had surgery for kidney stones in May. He is starting to have similar issues with weak split urine stream that is spraying. Has some pain at the tip of penis at end of urination. He knows that Dr Weir is out but really only wants to get worked in with him. Has had no recent imaging. Advised him to to go ER if unable to urinate or if things worsened. He is requesting an appt Tuesday.

## 2022-06-23 ENCOUNTER — TELEPHONE (OUTPATIENT)
Dept: UROLOGY | Facility: CLINIC | Age: 72
End: 2022-06-23

## 2022-06-23 ENCOUNTER — OFFICE VISIT (OUTPATIENT)
Dept: UROLOGY | Facility: CLINIC | Age: 72
End: 2022-06-23

## 2022-06-23 VITALS — RESPIRATION RATE: 16 BRPM | WEIGHT: 175 LBS | BODY MASS INDEX: 23.7 KG/M2 | HEIGHT: 72 IN

## 2022-06-23 DIAGNOSIS — N40.0 BENIGN PROSTATIC HYPERPLASIA, UNSPECIFIED WHETHER LOWER URINARY TRACT SYMPTOMS PRESENT: Primary | ICD-10-CM

## 2022-06-23 PROBLEM — F41.9 ANXIETY: Status: ACTIVE | Noted: 2021-11-01

## 2022-06-23 PROBLEM — R33.8 BENIGN PROSTATIC HYPERPLASIA WITH URINARY RETENTION: Status: ACTIVE | Noted: 2022-05-10

## 2022-06-23 PROBLEM — Z00.00 WELLNESS EXAMINATION: Status: ACTIVE | Noted: 2021-11-01

## 2022-06-23 PROBLEM — H92.01 RIGHT EAR PAIN: Status: ACTIVE | Noted: 2021-11-01

## 2022-06-23 PROBLEM — N42.9 PROSTATE DISORDER: Status: ACTIVE | Noted: 2022-05-14

## 2022-06-23 PROBLEM — J30.9 ALLERGIC RHINITIS: Status: ACTIVE | Noted: 2022-06-23

## 2022-06-23 PROBLEM — R73.9 ELEVATED BLOOD SUGAR: Status: ACTIVE | Noted: 2021-11-01

## 2022-06-23 PROBLEM — N40.1 BENIGN PROSTATIC HYPERPLASIA WITH URINARY RETENTION: Status: ACTIVE | Noted: 2022-05-10

## 2022-06-23 PROBLEM — N20.0 KIDNEY STONES: Status: ACTIVE | Noted: 2022-06-23

## 2022-06-23 PROBLEM — R33.9 UNABLE TO VOID: Status: ACTIVE | Noted: 2021-06-14

## 2022-06-23 PROBLEM — E55.9 VITAMIN D DEFICIENCY: Status: ACTIVE | Noted: 2021-11-01

## 2022-06-23 PROBLEM — F39 MOOD DISORDER (HCC): Status: ACTIVE | Noted: 2022-06-23

## 2022-06-23 LAB
BILIRUB BLD-MCNC: NEGATIVE MG/DL
CLARITY, POC: CLEAR
COLOR UR: YELLOW
EXPIRATION DATE: NORMAL
GLUCOSE UR STRIP-MCNC: NEGATIVE MG/DL
KETONES UR QL: NEGATIVE
LEUKOCYTE EST, POC: NEGATIVE
Lab: NORMAL
NITRITE UR-MCNC: NEGATIVE MG/ML
PH UR: 7 [PH] (ref 5–8)
PROT UR STRIP-MCNC: NEGATIVE MG/DL
RBC # UR STRIP: NEGATIVE /UL
SP GR UR: 1.01 (ref 1–1.03)
URINE VOLUME: NORMAL
UROBILINOGEN UR QL: NORMAL

## 2022-06-23 PROCEDURE — 99213 OFFICE O/P EST LOW 20 MIN: CPT | Performed by: NURSE PRACTITIONER

## 2022-06-23 PROCEDURE — 81003 URINALYSIS AUTO W/O SCOPE: CPT | Performed by: NURSE PRACTITIONER

## 2022-06-23 PROCEDURE — 51798 US URINE CAPACITY MEASURE: CPT | Performed by: NURSE PRACTITIONER

## 2022-06-23 RX ORDER — LORAZEPAM 1 MG/1
1 TABLET ORAL 2 TIMES DAILY
COMMUNITY
Start: 2022-06-10

## 2022-06-23 NOTE — PROGRESS NOTES
"Chief Complaint: Difficulty Urinating and Benign Prostatic Hypertrophy    Subjective         History of Present Illness  Pa Montano is a 71 y.o. male presents to [facility] UROLOG[facility] to be seen for difficulty urinating and BPH.    Patient has a history of kidney stones and had cystoscopy and basket stone removal of bladder stone on 5/14/2022 patient called the office on 6/16/2022 and stated that he is having a weak split stream with spraying with urination and pain at the tip of the penis and at the end of urination.    Patient was started on tamsulosin 0.4 mg daily back in May. He is not taking this at this time.    He states that he has had some dysuria and burning at the meatus and he has had a \"thin\" stream. He has to sit to void due to wandering stream.     He has a documented meatal stenosis requiring urethral dilation prior to cystoscopy.    He is requesting a urethral dilation.    Objective     Past Medical History:   Diagnosis Date   • Benign prostatic hyperplasia with urinary retention 5/10/2022   • Bladder disorder    • BPH (benign prostatic hyperplasia)    • Chronic allergic rhinitis    • Kidney stones    • Mood disorder (HCC)    • Prostate disorder    • Prostatitis 1 to 2 yrs ago   • UTI (urinary tract infection)        Past Surgical History:   Procedure Laterality Date   • CYSTOLITHALOPAXY PERCUTANEOUS N/A 05/14/2022    Procedure: CYSTOSCOPY AMD BASKET REMOVAL OF BLADDER STONE AND HEATON CATHETER INSERTION;  Surgeon: Lui Weir MD;  Location: [facility];  Service: Urology;  Laterality: N/A;   • CYSTOSCOPY     • OTHER SURGICAL HISTORY      Rezum Procedure   • PROSTATE SURGERY  rezume   • TOE SURGERY Bilateral     hammertoe surgery corrective.   • WRIST SURGERY Right          Current Outpatient Medications:   •  busPIRone (BUSPAR) 15 MG tablet, Take 15 mg by mouth 2 (Two) Times a Day., Disp: , Rfl:   •  Cholecalciferol 50 MCG (2000 UT) tablet, Take 2,000 Units by mouth " "Daily., Disp: , Rfl:   •  citalopram (CeleXA) 20 MG tablet, Take 20 mg by mouth Daily., Disp: , Rfl:   •  fluticasone (FLONASE) 50 MCG/ACT nasal spray, 1 spray into the nostril(s) as directed by provider Daily., Disp: , Rfl:   •  LORazepam (ATIVAN) 1 MG tablet, Take 1 tablet by mouth 2 (Two) Times a Day., Disp: , Rfl:   •  montelukast (SINGULAIR) 10 MG tablet, Take 10 mg by mouth Daily., Disp: , Rfl:   •  pravastatin (PRAVACHOL) 20 MG tablet, Take 20 mg by mouth Daily., Disp: , Rfl:   •  HYDROcodone-acetaminophen (NORCO) 5-325 MG per tablet, Take 1 tablet by mouth Every 6 (Six) Hours As Needed for Mild Pain ., Disp: 15 tablet, Rfl: 0  •  ibuprofen (ADVIL,MOTRIN) 200 MG tablet, Take 200 mg by mouth Every 8 (Eight) Hours As Needed., Disp: , Rfl:   •  LORazepam (ATIVAN) 1 MG tablet, Take 1 mg by mouth 3 (Three) Times a Day As Needed., Disp: , Rfl:   •  tamsulosin (FLOMAX) 0.4 MG capsule 24 hr capsule, Take 1 capsule by mouth Daily for 90 days., Disp: 90 capsule, Rfl: 0    Allergies   Allergen Reactions   • Insect Extract Allergy Skin Test Other (See Comments)     Discharged from  d/t anyphylactic response to being bitten by ants/anthill (fire ants)        History reviewed. No pertinent family history.    Social History     Socioeconomic History   • Marital status:    Tobacco Use   • Smoking status: Never Smoker   • Smokeless tobacco: Never Used   Vaping Use   • Vaping Use: Never used   Substance and Sexual Activity   • Alcohol use: Yes     Alcohol/week: 3.0 - 4.0 standard drinks     Types: 3 - 4 Cans of beer per week     Comment: Current Some Day   • Drug use: Never   • Sexual activity: Not Currently     Partners: Female     Birth control/protection: Other       Vital Signs:   Resp 16   Ht 182.9 cm (72\")   Wt 79.4 kg (175 lb)   BMI 23.73 kg/m²      Physical Exam     Result Review :   The following data was reviewed by: JEWELS Capps on 06/23/2022:  Results for orders placed or performed in " visit on 06/23/22   Bladder Scan   Result Value Ref Range    Urine Volume 0ml    POC Urinalysis Dipstick, Automated    Specimen: Urine   Result Value Ref Range    Color Yellow Yellow, Straw, Dark Yellow, Misa    Clarity, UA Clear Clear    Specific Gravity  1.015 1.005 - 1.030    pH, Urine 7.0 5.0 - 8.0    Leukocytes Negative Negative    Nitrite, UA Negative Negative    Protein, POC Negative Negative mg/dL    Glucose, UA Negative Negative mg/dL    Ketones, UA Negative Negative    Urobilinogen, UA Normal Normal    Bilirubin Negative Negative    Blood, UA Negative Negative    Lot Number [id number]     Expiration Date 5/2,023        Bladder Scan interpretation 06/23/2022    Estimation of residual urine via BVI 3000 Verathon Bladder Scan  MA/nurse performing: Skyla MADRIGAL MA   Residual Urine: 0 ml  Indication: Benign prostatic hyperplasia, unspecified whether lower urinary tract symptoms present   Position: Supine  Examination: Incremental scanning of the suprapubic area using 2.0 MHz transducer using copious amounts of acoustic gel.   Findings: An anechoic area was demonstrated which represented the bladder, with measurement of residual urine as noted. I inspected this myself. In that the residual urine was insignificant, refer to plan for treatment and plan necessary at this time.           Procedures        Assessment and Plan    Diagnoses and all orders for this visit:    1. Benign prostatic hyperplasia, unspecified whether lower urinary tract symptoms present (Primary)  -     POC Urinalysis Dipstick, Automated  -     Bladder Scan    educated to restart tamsulosin and get him in with shubham for in office cysto to evaluate stricture/stenosis.      I spent 10  minutes caring for Pa on this date of service. This time includes time spent by me in the following activities:reviewing tests, obtaining and/or reviewing a separately obtained history, performing a medically appropriate examination and/or evaluation , counseling  and educating the patient/family/caregiver, ordering medications, tests, or procedures, and documenting information in the medical record  Follow Up   Return for With Dr. Weir For SX consult .  Patient was given instructions and counseling regarding his condition or for health maintenance advice. Please see specific information pulled into the AVS if appropriate.         This document has been electronically signed by JEWELS Capps  June 23, 2022 12:18 EDT

## 2022-06-24 ENCOUNTER — PROCEDURE VISIT (OUTPATIENT)
Dept: UROLOGY | Facility: CLINIC | Age: 72
End: 2022-06-24

## 2022-06-24 DIAGNOSIS — N20.0 NEPHROLITHIASIS: Primary | ICD-10-CM

## 2022-06-24 DIAGNOSIS — N40.1 BENIGN PROSTATIC HYPERPLASIA WITH URINARY HESITANCY: ICD-10-CM

## 2022-06-24 DIAGNOSIS — N99.115 POSTPROCEDURAL MALE FOSSA NAVICULARIS URETHRAL STRICTURE: ICD-10-CM

## 2022-06-24 DIAGNOSIS — R39.11 BENIGN PROSTATIC HYPERPLASIA WITH URINARY HESITANCY: ICD-10-CM

## 2022-06-24 PROCEDURE — 99213 OFFICE O/P EST LOW 20 MIN: CPT | Performed by: UROLOGY

## 2022-06-24 PROCEDURE — 52281 CYSTOSCOPY AND TREATMENT: CPT | Performed by: UROLOGY

## 2022-06-24 NOTE — PROGRESS NOTES
Chief Complaint    Urologic complaint    Subjective          Pa Montano presents to [facility] UROLOGY  History of Present Illness     70-year- WM    BPH status post Rezu in 2/20  ED  History of urethral/bladder stones  Nephrolithiasis        Patient was doing well after surgery but in the last couple weeks has been having more trouble voiding.  Is been having small caliber stream that sprays.    No gross hematuria or dysuria.  He has been having some dysuria but this is improved the last few days.      5/22 100% calcium phosphate  5/14/2022 cystoscopy with basket removal of bladder stones/urethral dilation -3 cm prostate with open TUR defect from [facility].  Patient had 3 urethral stones 8, 9 and 10 mm.  These were flushed  5/14/2022 CT abdomen/pelvis without - [place] - 14 x 7 mm bulbar urethral stone, 50 g prostate.  Left kidney has a 3 mm nonobstructing stone, right kidney has punctate stones.  Negative otherwise.    5/4/2022 saw nurse practitioner started Flomax      PVR          5/22   150       history of prostatitis    no prostate meds.    Sildenafil 20 mg-does help, can cause headache      No cardiopulmonary history.  Patient does not smoke.  Patient does not use blood thinner.  Patient is very active.  Walks 5 miles every day.    Results for orders placed or performed in visit on 06/23/22   Bladder Scan   Result Value Ref Range    Urine Volume 0ml    POC Urinalysis Dipstick, Automated    Specimen: Urine   Result Value Ref Range    Color Yellow Yellow, Straw, Dark Yellow, Misa    Clarity, UA Clear Clear    Specific Gravity  1.015 1.005 - 1.030    pH, Urine 7.0 5.0 - 8.0    Leukocytes Negative Negative    Nitrite, UA Negative Negative    Protein, POC Negative Negative mg/dL    Glucose, UA Negative Negative mg/dL    Ketones, UA Negative Negative    Urobilinogen, UA Normal Normal    Bilirubin Negative Negative    Blood, UA Negative Negative    Lot Number [id number]     Expiration Date  5/2,023          PVR     2/21      50  12/20    120  12/20    408  10/19/20  Rezum  11/20  25  3/20    72    Previous    9/20 cystoscopy3 cm prostate, minor to moderate trabeculations, negative otherwise.    Flomax 0.4-  caused sever dizzeness.  Stopped this    Patient has had a CT several years ago that did show some nonobstructing stones.  He has never passed a kidney stone.    Patient has been worked up for gross hematuria before    2002 cystoscopy negative, also had some imaging at that time that was negative      3/20 Cr 0.74, GFR 11    PSA    11/21  2.89  6/20   4.38  3/20  7.32  2/20  7.38  6/19 3.83  5/18 2.85  3/16 2.26  1/13 1.63  12/12 1.87  10/06 3.1          Past History:  Medical History: has a past medical history of Benign prostatic hyperplasia with urinary retention (5/10/2022), Bladder disorder, BPH (benign prostatic hyperplasia), Chronic allergic rhinitis, Kidney stones, Mood disorder ([facility]), Prostate disorder, Prostatitis (1 to 2 yrs ago), and UTI (urinary tract infection).   Surgical History: has a past surgical history that includes Wrist surgery (Right); Other surgical history; Toe Surgery (Bilateral); Cystolithalopaxy (N/A, 05/14/2022); Prostate surgery (rezume); and Cystoscopy.   Family History: family history is not on file.   Social History: reports that he has never smoked. He has never used smokeless tobacco. He reports current alcohol use of about 3.0 - 4.0 standard drinks of alcohol per week. He reports that he does not use drugs.  Allergies: Insect extract allergy skin test       Current Outpatient Medications:   •  busPIRone (BUSPAR) 15 MG tablet, Take 15 mg by mouth 2 (Two) Times a Day., Disp: , Rfl:   •  Cholecalciferol 50 MCG (2000 UT) tablet, Take 2,000 Units by mouth Daily., Disp: , Rfl:   •  citalopram (CeleXA) 20 MG tablet, Take 20 mg by mouth Daily., Disp: , Rfl:   •  fluticasone (FLONASE) 50 MCG/ACT nasal spray, 1 spray into the nostril(s) as directed by provider Daily.,  Disp: , Rfl:   •  HYDROcodone-acetaminophen (NORCO) 5-325 MG per tablet, Take 1 tablet by mouth Every 6 (Six) Hours As Needed for Mild Pain ., Disp: 15 tablet, Rfl: 0  •  ibuprofen (ADVIL,MOTRIN) 200 MG tablet, Take 200 mg by mouth Every 8 (Eight) Hours As Needed., Disp: , Rfl:   •  LORazepam (ATIVAN) 1 MG tablet, Take 1 mg by mouth 3 (Three) Times a Day As Needed., Disp: , Rfl:   •  LORazepam (ATIVAN) 1 MG tablet, Take 1 tablet by mouth 2 (Two) Times a Day., Disp: , Rfl:   •  montelukast (SINGULAIR) 10 MG tablet, Take 10 mg by mouth Daily., Disp: , Rfl:   •  pravastatin (PRAVACHOL) 20 MG tablet, Take 20 mg by mouth Daily., Disp: , Rfl:   •  tamsulosin (FLOMAX) 0.4 MG capsule 24 hr capsule, Take 1 capsule by mouth Daily for 90 days., Disp: 90 capsule, Rfl: 0     Physical exam       Alert and orient x3  Well appearing, well developed, in no acute distress   Unlabored respirations    Grossly oriented to person, place and time, judgment is intact, normal mood and affect    Results for orders placed or performed in visit on 06/23/22   Bladder Scan   Result Value Ref Range    Urine Volume 0ml    POC Urinalysis Dipstick, Automated    Specimen: Urine   Result Value Ref Range    Color Yellow Yellow, Straw, Dark Yellow, Misa    Clarity, UA Clear Clear    Specific Gravity  1.015 1.005 - 1.030    pH, Urine 7.0 5.0 - 8.0    Leukocytes Negative Negative    Nitrite, UA Negative Negative    Protein, POC Negative Negative mg/dL    Glucose, UA Negative Negative mg/dL    Ketones, UA Negative Negative    Urobilinogen, UA Normal Normal    Bilirubin Negative Negative    Blood, UA Negative Negative    Lot Number [id number]     Expiration Date 5/2,023         Objective     Vital Signs:   There were no vitals taken for this visit.             Assessment and Plan    Diagnoses and all orders for this visit:    1. Nephrolithiasis (Primary)    2. Benign prostatic hyperplasia with urinary hesitancy      BPH    Continue Flomax 0.4 mg daily          ED    Sildenafil prn      Meatal stenosis      Patient dilated today, follow-up in 6 months    PVR at follow-up        Procedures       Urinalysis was checked today and was negative for signs of infection      Cytoscopy Procedure with urethral dilation:     Procedure:         Could not get the scope into the penis after prepping and timeout.  I went ahead and sequentially dilated starting at 12 Palauan up to 22 Palauan with sequential dilation.      Flexible cytoscope was passed per urethra into the bladder without difficulty after proper consent. The bladder was inspected in a systematic meridian fashion.     4 cm prostate, open defect at the bladder neck but lobes are coapting close to the verumontanum.    Monitor to moderate trabeculations throughout    There were no tumors, lesions, stones, or other abnormalities noted within the bladder. Of note, there was no increased vascularity as well. Both ureteral orifices were identified and were normal in appearance. The flexible cytoscope was removed. The patient tolerated the procedure well.         This document has been electronically signed by Lui Weir MD  June 24, 2022 08:02 EDT

## 2022-08-05 ENCOUNTER — TELEPHONE (OUTPATIENT)
Dept: UROLOGY | Facility: CLINIC | Age: 72
End: 2022-08-05

## 2022-08-05 DIAGNOSIS — F41.9 ANXIETY: Primary | ICD-10-CM

## 2022-08-05 RX ORDER — DIAZEPAM 5 MG/1
10 TABLET ORAL ONCE
Qty: 1 TABLET | Refills: 0 | Status: SHIPPED | OUTPATIENT
Start: 2022-08-05 | End: 2022-08-05

## 2022-08-05 NOTE — TELEPHONE ENCOUNTER
UNABLE TO REACH CLINICAL TEAM    Caller: DOROTHY SLOAN     Relationship: SELF     Best call back number: [phone]    PT IS HAVING WORSENING SYMPTOMS FROM HIS RECENT CYSTOSCOPY, REQUESTING TO BE SEEN SOONER. ATTEMPTED TO REACH CLINICAL LINE, UNABLE TO REACH ANYONE.

## 2022-08-08 ENCOUNTER — OFFICE VISIT (OUTPATIENT)
Dept: UROLOGY | Facility: CLINIC | Age: 72
End: 2022-08-08

## 2022-08-08 DIAGNOSIS — N35.911 STRICTURE OF URETHRAL MEATUS IN MALE, UNSPECIFIED STRICTURE TYPE: Primary | ICD-10-CM

## 2022-08-08 DIAGNOSIS — N52.9 ERECTILE DYSFUNCTION, UNSPECIFIED ERECTILE DYSFUNCTION TYPE: ICD-10-CM

## 2022-08-08 LAB
BILIRUB BLD-MCNC: NEGATIVE MG/DL
CLARITY, POC: CLEAR
COLOR UR: NORMAL
EXPIRATION DATE: NORMAL
GLUCOSE UR STRIP-MCNC: NEGATIVE MG/DL
KETONES UR QL: NEGATIVE
LEUKOCYTE EST, POC: NEGATIVE
Lab: NORMAL
NITRITE UR-MCNC: NEGATIVE MG/ML
PH UR: 6 [PH] (ref 5–8)
PROT UR STRIP-MCNC: NEGATIVE MG/DL
RBC # UR STRIP: NEGATIVE /UL
SP GR UR: 1.03 (ref 1–1.03)
UROBILINOGEN UR QL: NORMAL

## 2022-08-08 PROCEDURE — 81003 URINALYSIS AUTO W/O SCOPE: CPT | Performed by: UROLOGY

## 2022-08-08 PROCEDURE — 52000 CYSTOURETHROSCOPY: CPT | Performed by: UROLOGY

## 2022-08-08 NOTE — PROGRESS NOTES
Assessment and Plan    Diagnoses and all orders for this visit:    1. Stricture of urethral meatus in male, unspecified stricture type (Primary)    2. Erectile dysfunction, unspecified erectile dysfunction type              Procedures       Urinalysis was checked today and was negative for signs of infection      Cytoscopy Procedure with urethral dilation:     Procedure:       Patient with a meatal stricture about 2 mm into the urethra    Had to start with a 10 Gambian, 1 of a 22 Gambian.    Sequential dilation      Flexible cytoscope was passed per urethra into the bladder without difficulty after proper consent. The bladder was inspected in a systematic meridian fashion.     4 cm prostate, open defect at the bladder neck but lobes are coapting close to the verumontanum.     moderate trabeculations throughout    There were no tumors, lesions, stones, or other abnormalities noted within the bladder. Of note, there was no increased vascularity as well. Both ureteral orifices were identified and were normal in appearance. The flexible cytoscope was removed. The patient tolerated the procedure well.         6/22 cystoscopy with urethral dilation   -dilated from 12-22 Gambian, meatal stricture -4 cm lobes are coapting close to the verumontanum open at the bladder neck.  Moderate trabeculations throughout      BPH    Continue Flomax 0.4 mg daily         ED    Sildenafil prn      Meatal stenosis      Patient dilated today, .  Patient will start passing 20 Gambian catheter a few centimeters into the urethra weekly.    Follow-up in 4 months    PVR follow-up      This document has been electronically signed by Lui Weir MD  August 8, 2022 08:53 EDT

## 2022-08-29 NOTE — TELEPHONE ENCOUNTER
LVM to see if patient could come in tomorrow at 1:00 to see . will be for cysto possible dilation  
There are no Wet Read(s) to document.

## 2023-01-15 PROBLEM — N35.010 POST-TRAUMATIC MALE URETHRAL MEATAL STRICTURE: Status: ACTIVE | Noted: 2023-01-15

## 2023-01-15 PROBLEM — N40.1 BENIGN PROSTATIC HYPERPLASIA WITH LOWER URINARY TRACT SYMPTOMS: Status: ACTIVE | Noted: 2023-01-15

## 2023-01-17 ENCOUNTER — OFFICE VISIT (OUTPATIENT)
Dept: UROLOGY | Facility: CLINIC | Age: 73
End: 2023-01-17
Payer: MEDICARE

## 2023-01-17 VITALS — RESPIRATION RATE: 19 BRPM

## 2023-01-17 DIAGNOSIS — N40.1 BENIGN PROSTATIC HYPERPLASIA WITH LOWER URINARY TRACT SYMPTOMS, SYMPTOM DETAILS UNSPECIFIED: Primary | ICD-10-CM

## 2023-01-17 DIAGNOSIS — N35.010 POST-TRAUMATIC MALE URETHRAL MEATAL STRICTURE: ICD-10-CM

## 2023-01-17 LAB
BILIRUB BLD-MCNC: NEGATIVE MG/DL
CLARITY, POC: CLEAR
COLOR UR: YELLOW
EXPIRATION DATE: NORMAL
GLUCOSE UR STRIP-MCNC: NEGATIVE MG/DL
KETONES UR QL: NEGATIVE
LEUKOCYTE EST, POC: NEGATIVE
Lab: NORMAL
NITRITE UR-MCNC: NEGATIVE MG/ML
PH UR: 6 [PH] (ref 5–8)
PROT UR STRIP-MCNC: NEGATIVE MG/DL
RBC # UR STRIP: NEGATIVE /UL
SP GR UR: 1.01 (ref 1–1.03)
SPECIMEN VOL 24H UR: NORMAL L
UROBILINOGEN UR QL: NORMAL

## 2023-01-17 PROCEDURE — 51798 US URINE CAPACITY MEASURE: CPT | Performed by: UROLOGY

## 2023-01-17 PROCEDURE — 99212 OFFICE O/P EST SF 10 MIN: CPT | Performed by: UROLOGY

## 2023-01-17 PROCEDURE — 81003 URINALYSIS AUTO W/O SCOPE: CPT | Performed by: UROLOGY

## 2023-01-17 RX ORDER — MOMETASONE FUROATE 1 MG/G
CREAM TOPICAL
COMMUNITY
Start: 2022-10-17

## 2023-01-17 NOTE — PROGRESS NOTES
Chief Complaint: Urologic complaint    Subjective         History of Present Illness  Pa Montano is a 72 y.o. male        72-year    [facility]  Distal anterior urethral stricture/almost meatal    Patient was passing catheter into urethra every couple weeks, he has not done this for couple months.  Ran out.    Voiding okay, no straining.  Not bothered currently.    No gross hematuria /dysuria    8/22 urethral dilation in office - meatal stricture 2 mm into the urethra.  Dilated to 24 cm prostate open defect at the bladder neck but labs are coapting at the verumontanum.  Moderate trabeculations    6/22 cystoscopy with urethral dilation   -dilated from 12-22 Danish, meatal stricture -4 cm lobes are coapting close to the verumontanum open at the bladder neck.  Moderate trabeculations throughout    5/14/2022 cystoscopy with basket removal of stone and urethral dilation - 3 cm prostate with [facility]  Lobes were just barely coapted.  Some undermining from the [facility] defect at the prostatic urethra.  Place catheter over wire.  3 urethral stones 8 mm, 9 mm and 1 cm.  All stones removed    Flomax-could not tolerate it made dizzy       history of prostatitis    no prostate meds.     No ED, has used tadalafil in the past     No cardiopulmonary history.  Patient does not smoke.  Patient does not use blood thinner.  Patient is very active.  Walks 5 miles every day.                  PVR     1/23     15  2/21      50  12/20    120  12/20    408  10/19/20  Rezum  11/20  25  3/20    72        Previous    9/20 cystoscopy3 cm prostate, minor to moderate trabeculations, negative otherwise.    Flomax 0.4-  caused sever dizzeness.  Stopped this    Patient has had a CT several years ago that did show some nonobstructing stones.  He has never passed a kidney stone.    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   Findings: An anechoic area was demonstrated which represented the bladder, with measurement of residual urine as noted. I inspected this myself. In that the residual urine was stable or insignificant, refer to plan for treatment and plan necessary at this time.         Objective     Past Medical History:   Diagnosis Date   • Benign prostatic hyperplasia with urinary retention 5/10/2022   • Bladder disorder    • BPH (benign prostatic hyperplasia)    • Chronic allergic rhinitis    • Kidney stones    • Mood disorder (HCC)    • Prostate disorder    • Prostatitis 1 to 2 yrs ago   • UTI (urinary tract infection)        Past Surgical History:   Procedure Laterality Date   • CYSTOLITHALOPAXY PERCUTANEOUS N/A 05/14/2022    Procedure: CYSTOSCOPY AMD BASKET REMOVAL OF BLADDER STONE AND HEATON CATHETER INSERTION;  Surgeon: Lui Weir MD;  Location: [facility];  Service: Urology;  Laterality: N/A;   • CYSTOSCOPY     • OTHER SURGICAL HISTORY      Rezum Procedure   • PROSTATE SURGERY  rezume   • TOE SURGERY Bilateral     hammertoe surgery corrective.   • WRIST SURGERY Right          Current Outpatient Medications:   •  busPIRone (BUSPAR) 15 MG tablet, Take 15 mg by mouth 2 (Two) Times a Day., Disp: , Rfl:   •  Cholecalciferol 50 MCG (2000 UT) tablet, Take 2,000 Units by mouth  Daily., Disp: , Rfl:   •  citalopram (CeleXA) 20 MG tablet, Take 20 mg by mouth Daily., Disp: , Rfl:   •  fluticasone (FLONASE) 50 MCG/ACT nasal spray, 1 spray into the nostril(s) as directed by provider Daily., Disp: , Rfl:   •  ibuprofen (ADVIL,MOTRIN) 200 MG tablet, Take 200 mg by mouth Every 8 (Eight) Hours As Needed., Disp: , Rfl:   •  LORazepam (ATIVAN) 1 MG tablet, Take 1 tablet by mouth 2 (Two) Times a Day., Disp: , Rfl:   •  mometasone (ELOCON) 0.1 % cream, APPLY CREAM TOPICALLY ONCE DAILY TO AFFECTED AREA AS NEEDED, Disp: , Rfl:   •  montelukast (SINGULAIR) 10 MG tablet, Take 10 mg by mouth Daily., Disp: , Rfl:   •  pravastatin (PRAVACHOL) 20 MG tablet, Take 20 mg by mouth Daily., Disp: , Rfl:     Allergies   Allergen Reactions   • Insect Extract Allergy Skin Test Other (See Comments)     Discharged from  d/t anyphylactic response to being bitten by ants/anthill (fire ants)        History reviewed. No pertinent family history.    Social History     Socioeconomic History   • Marital status:    Tobacco Use   • Smoking status: Never   • Smokeless tobacco: Never   Vaping Use   • Vaping Use: Never used   Substance and Sexual Activity   • Alcohol use: Yes     Alcohol/week: 3.0 - 4.0 standard drinks     Types: 3 - 4 Cans of beer per week     Comment: Current Some Day   • Drug use: Never   • Sexual activity: Not Currently     Partners: Female     Birth control/protection: Other       Vital Signs:   Resp 19          Results for orders placed or performed in visit on 01/17/23   Bladder Scan   Result Value Ref Range    Volume 15ML    POC Urinalysis Dipstick, Automated    Specimen: Urine   Result Value Ref Range    Color Yellow Yellow, Straw, Dark Yellow, Misa    Clarity, UA Clear Clear    Specific Gravity  1.015 1.005 - 1.030    pH, Urine 6.0 5.0 - 8.0    Leukocytes Negative Negative    Nitrite, UA Negative Negative    Protein, POC Negative Negative mg/dL    Glucose, UA Negative Negative mg/dL     Ketones, UA Negative Negative    Urobilinogen, UA Normal Normal, 0.2 E.U./dL    Bilirubin Negative Negative    Blood, UA Negative Negative    Lot Number [id number]     Expiration Date 4/2,024              Assessment and Plan    Diagnoses and all orders for this visit:    1. Benign prostatic hyperplasia with lower urinary tract symptoms, symptom details unspecified (Primary)  -     POC Urinalysis Dipstick, Automated  -     Bladder Scan          BPH     Voiding okay.  No meds needed.              Meatal stenosis       Patient is doing good at this time, he has not cathed for couple months and after discussion we will have him a few 18 Burmese catheters on hand if he has trouble but he is not wanting to continue cathing at this time we will see how things go moving forward      Follow-up in 1 year       PVR follow-up

## 2023-08-23 ENCOUNTER — TELEPHONE (OUTPATIENT)
Dept: UROLOGY | Facility: CLINIC | Age: 73
End: 2023-08-23
Payer: MEDICARE

## 2023-08-23 DIAGNOSIS — R35.0 URINARY FREQUENCY: Primary | ICD-10-CM

## 2023-08-23 NOTE — TELEPHONE ENCOUNTER
Notified pt of Dr Weir recommendations. He agreed to do a ucx but would like me to fax that order to [facility]. I have placed order and will fax it now.   He said he is no longer having diarrhea so that does not need to be addressed.

## 2023-08-23 NOTE — TELEPHONE ENCOUNTER
PATIENT IS HAVING URINARY INCONTINENCE.  HE HAS TO GET UP SEVERAL TIMES AT NIGHT.  DOESN'T EMPTY LIKE HE SHOULD AND DRIBBLES.  URINE GETS ON HIS UNDERWEAR.  IT HAS BEEN GOING ON FOR A WHILE, AND HE THOUGHT IT WOULD GET BETTER, BUT IT HAS GOTTEN WORSE.    HE ASKED FOR AN APPOINTMENT WITH DR. OLVERA.

## 2024-01-20 PROBLEM — Q64.33 CONGENITAL MEATAL STENOSIS: Status: ACTIVE | Noted: 2024-01-20

## 2024-01-20 NOTE — PROGRESS NOTES
Chief Complaint: Urologic complaint    Subjective         History of Present Illness  Pa Montano is a 73 y.o. male        73-year    BPH status post Rezum in 2/20  Distal anterior urethral stricture/almost meatal      Ok stream.  No straining.  Slower at the end. Has to double void  Minor urge incontinence.  No pads.      Patient was passing catheter into urethra every couple weeks, he has not done this for couple months.  Ran out.    Voiding okay, no straining.  Not bothered currently.     No GH      Patient has done cathing for meatal stricture in the past, not done this for the last year without issue    no prostate meds.    No cardiopulmonary history.  Patient does not smoke.  Patient does not use blood thinner.  Patient is very active.  Walks 5 miles every day.      Results for orders placed or performed in visit on 01/22/24   Bladder Scan   Result Value Ref Range    Volume 25    POC Urinalysis Dipstick, Automated    Specimen: Urine   Result Value Ref Range    Color Yellow Yellow, Straw, Dark Yellow, Misa    Clarity, UA Clear Clear    Specific Gravity  1.005 1.005 - 1.030    pH, Urine 6.0 5.0 - 8.0    Leukocytes Negative Negative    Nitrite, UA Negative Negative    Protein, POC Negative Negative mg/dL    Glucose, UA Negative Negative mg/dL    Ketones, UA Negative Negative    Urobilinogen, UA 0.2 E.U./dL Normal, 0.2 E.U./dL    Bilirubin Negative Negative    Blood, UA Negative Negative    Lot Number [id number]     Expiration Date 12/2,024            Previous      8/22 urethral dilation in office - meatal stricture 2 mm into the urethra.  Dilated to 24 cm prostate open defect at the bladder neck but labs are coapting at the verumontanum.  Moderate trabeculations    6/22 cystoscopy with urethral dilation   -dilated from 12-22 Armenian, meatal stricture -4 cm lobes are coapting close to the verumontanum open at the bladder neck.  Moderate trabeculations throughout    5/14/2022 cystoscopy with basket removal of  stone and urethral dilation - 3 cm prostate with open TUR defect from Rezum.  Lobes were just barely coapted.  Some undermining from the Rezum defect at the prostatic urethra.  Place catheter over wire.  3 urethral stones 8 mm, 9 mm and 1 cm.  All stones removed    Flomax-could not tolerate it made dizzy       history of prostatitis         No ED, has used tadalafil in the past                     PVR     1/24     25  1/23     15  2/21      50  12/20    120  12/20    408  10/19/20  Rezum  11/20  25  3/20    72      9/20 cystoscopy3 cm prostate, minor to moderate trabeculations, negative otherwise.    Flomax 0.4-  caused sever dizzeness.  Stopped this    Patient has had a CT several years ago that did show some nonobstructing stones.  He has never passed a kidney stone.    Patient has been worked up for gross hematuria before    2002 cystoscopy negative, also had some imaging at that time that was negative      3/20 Cr 0.74, GFR 11    PSA     11/21  2.89  6/20   4.38  3/20  7.32  2/20  7.38  6/19 3.83  5/18 2.85  3/16 2.26  1/13 1.63  12/12 1.87  10/06 3.1    Sees PCP yearly    Bladder Scan interpretation 01/17/2023    Estimation of residual urine via BVI 3000 Verathon Bladder Scan  MA/nurse performing: Bernice Price MA  Residual Urine: 015 ml  Indication: Benign prostatic hyperplasia with lower urinary tract symptoms, symptom details unspecified    Congenital meatal stenosis   Position: Supine  Examination: Incremental scanning of the suprapubic area using 2.0 MHz transducer using copious amounts of acoustic gel.   Findings: An anechoic area was demonstrated which represented the bladder, with measurement of residual urine as noted. I inspected this myself. In that the residual urine was stable or insignificant, refer to plan for treatment and plan necessary at this time.         Objective     Past Medical History:   Diagnosis Date    Benign prostatic hyperplasia with urinary retention 5/10/2022    Bladder disorder      BPH (benign prostatic hyperplasia)     Chronic allergic rhinitis     Kidney stones     Mood disorder     Prostate disorder     Prostatitis 1 to 2 yrs ago    UTI (urinary tract infection)        Past Surgical History:   Procedure Laterality Date    CYSTOLITHALOPAXY PERCUTANEOUS N/A 05/14/2022    Procedure: CYSTOSCOPY AMD BASKET REMOVAL OF BLADDER STONE AND HEATON CATHETER INSERTION;  Surgeon: Lui Weir MD;  Location: [facility] MAIN OR;  Service: Urology;  Laterality: N/A;    CYSTOSCOPY      OTHER SURGICAL HISTORY      Rezum Procedure    PROSTATE SURGERY  rezume    TOE SURGERY Bilateral     hammertoe surgery corrective.    WRIST SURGERY Right          Current Outpatient Medications:     busPIRone (BUSPAR) 15 MG tablet, Take 15 mg by mouth 2 (Two) Times a Day., Disp: , Rfl:     Cholecalciferol 50 MCG (2000 UT) tablet, Take 2,000 Units by mouth Daily., Disp: , Rfl:     citalopram (CeleXA) 20 MG tablet, Take 20 mg by mouth Daily., Disp: , Rfl:     fluticasone (FLONASE) 50 MCG/ACT nasal spray, 1 spray into the nostril(s) as directed by provider Daily., Disp: , Rfl:     ibuprofen (ADVIL,MOTRIN) 200 MG tablet, Take 200 mg by mouth Every 8 (Eight) Hours As Needed., Disp: , Rfl:     LORazepam (ATIVAN) 1 MG tablet, Take 1 tablet by mouth 2 (Two) Times a Day., Disp: , Rfl:     mometasone (ELOCON) 0.1 % cream, APPLY CREAM TOPICALLY ONCE DAILY TO AFFECTED AREA AS NEEDED, Disp: , Rfl:     montelukast (SINGULAIR) 10 MG tablet, Take 10 mg by mouth Daily., Disp: , Rfl:     pravastatin (PRAVACHOL) 20 MG tablet, Take 20 mg by mouth Daily., Disp: , Rfl:     Allergies   Allergen Reactions    Insect Extract Other (See Comments)     Discharged from  d/t anyphylactic response to being bitten by ants/anthill (fire ants)        No family history on file.    Social History     Socioeconomic History    Marital status:    Tobacco Use    Smoking status: Never    Smokeless tobacco: Never   Vaping Use    Vaping Use: Never  used   Substance and Sexual Activity    Alcohol use: Yes     Alcohol/week: 3.0 - 4.0 standard drinks of alcohol     Types: 3 - 4 Cans of beer per week     Comment: Current Some Day    Drug use: Never    Sexual activity: Not Currently     Partners: Female     Birth control/protection: Other       Vital Signs:   There were no vitals taken for this visit.         Results for orders placed or performed in visit on 01/17/23   Bladder Scan   Result Value Ref Range    Volume 15ML    POC Urinalysis Dipstick, Automated    Specimen: Urine   Result Value Ref Range    Color Yellow Yellow, Straw, Dark Yellow, Misa    Clarity, UA Clear Clear    Specific Gravity  1.015 1.005 - 1.030    pH, Urine 6.0 5.0 - 8.0    Leukocytes Negative Negative    Nitrite, UA Negative Negative    Protein, POC Negative Negative mg/dL    Glucose, UA Negative Negative mg/dL    Ketones, UA Negative Negative    Urobilinogen, UA Normal Normal, 0.2 E.U./dL    Bilirubin Negative Negative    Blood, UA Negative Negative    Lot Number [id number]     Expiration Date 4/2,024         Bladder Scan interpretation 01/22/2024    Estimation of residual urine via BVI 3000 Verathon Bladder Scan  MA/nurse performing: Bernice QUINTANA   Residual Urine: 25 ml  Indication: Benign prostatic hyperplasia with lower urinary tract symptoms, symptom details unspecified    Congenital meatal stenosis   Position: Supine  Examination: Incremental scanning of the suprapubic area using 2.0 MHz transducer using copious amounts of acoustic gel.   Findings: An anechoic area was demonstrated which represented the bladder, with measurement of residual urine as noted. I inspected this myself. In that the residual urine was stable or insignificant, refer to plan for treatment and plan necessary at this time.            Assessment and Plan    Diagnoses and all orders for this visit:    1. Benign prostatic hyperplasia with lower urinary tract symptoms, symptom details unspecified (Primary)    2.  Congenital meatal stenosis          BPH     Voiding okay.    Having some minor urge incontinence this past year.  We did discuss starting medications or evaluation with cystoscopy.  At this time we are going to hold off he let me know if things get worse              Follow-up in 1 year       PVR follow-up

## 2024-01-22 ENCOUNTER — OFFICE VISIT (OUTPATIENT)
Dept: UROLOGY | Facility: CLINIC | Age: 74
End: 2024-01-22
Payer: MEDICARE

## 2024-01-22 VITALS — RESPIRATION RATE: 16 BRPM | HEIGHT: 72 IN | WEIGHT: 170 LBS | BODY MASS INDEX: 23.03 KG/M2

## 2024-01-22 DIAGNOSIS — Q64.33 CONGENITAL MEATAL STENOSIS: ICD-10-CM

## 2024-01-22 DIAGNOSIS — N40.1 BENIGN PROSTATIC HYPERPLASIA WITH LOWER URINARY TRACT SYMPTOMS, SYMPTOM DETAILS UNSPECIFIED: Primary | ICD-10-CM

## 2024-01-22 LAB
BILIRUB BLD-MCNC: NEGATIVE MG/DL
CLARITY, POC: CLEAR
COLOR UR: YELLOW
EXPIRATION DATE: NORMAL
GLUCOSE UR STRIP-MCNC: NEGATIVE MG/DL
KETONES UR QL: NEGATIVE
LEUKOCYTE EST, POC: NEGATIVE
Lab: NORMAL
NITRITE UR-MCNC: NEGATIVE MG/ML
PH UR: 6 [PH] (ref 5–8)
PROT UR STRIP-MCNC: NEGATIVE MG/DL
RBC # UR STRIP: NEGATIVE /UL
SP GR UR: 1 (ref 1–1.03)
SPECIMEN VOL 24H UR: 25 L
UROBILINOGEN UR QL: NORMAL

## 2024-01-22 PROCEDURE — 1160F RVW MEDS BY RX/DR IN RCRD: CPT | Performed by: UROLOGY

## 2024-01-22 PROCEDURE — 81003 URINALYSIS AUTO W/O SCOPE: CPT | Performed by: UROLOGY

## 2024-01-22 PROCEDURE — 51798 US URINE CAPACITY MEASURE: CPT | Performed by: UROLOGY

## 2024-01-22 PROCEDURE — 99212 OFFICE O/P EST SF 10 MIN: CPT | Performed by: UROLOGY

## 2024-01-22 PROCEDURE — 1159F MED LIST DOCD IN RCRD: CPT | Performed by: UROLOGY

## 2024-01-22 RX ORDER — EPINEPHRINE 0.3 MG/.3ML
0.3 INJECTION SUBCUTANEOUS
COMMUNITY
Start: 2023-08-09

## 2025-01-22 NOTE — PROGRESS NOTES
Chief Complaint: Urologic complaint    Subjective         History of Present Illness  Pa Montano is a 74 y.o. male        74-M    BPH status post Rezum in 2/20  Distal anterior urethral stricture/almost meatal    Good stream.  Dealing with frequency.  Noc X 3   No straining.    Does have a double void at times.    With postvoid dribbling, wears 1 safety pad     No GH/dysuria    no prostate meds.    No cardiopulmonary history.  Patient does not smoke.  Patient does not use blood thinner.  Patient is very active.  Walks 5 miles every day.          Previous    Was passing a catheter once a week at 1 point for urethral stricture but has not done this for a long time      8/22 urethral dilation in office - meatal stricture 2 mm into the urethra.  Dilated to 24 cm prostate open defect at the bladder neck but labs are coapting at the verumontanum.  Moderate trabeculations    6/22 cystoscopy with urethral dilation   -dilated from 12-22 Indonesian, meatal stricture -4 cm lobes are coapting close to the verumontanum open at the bladder neck.  Moderate trabeculations throughout    5/14/2022 cystoscopy with basket removal of stone and urethral dilation - 3 cm prostate with open TUR defect from Rezu.  Lobes were just barely coapted.  Some undermining from the Rezum defect at the prostatic urethra.  Place catheter over wire.  3 urethral stones 8 mm, 9 mm and 1 cm.  All stones removed    Flomax-could not tolerate it made dizzy       history of prostatitis         No ED, has used tadalafil in the past                     PVR     1/25    000  1/24     25  1/23     15  2/21      50  12/20    120  12/20    408  10/19/20  Rezum  11/20  25  3/20    72      9/20 cystoscopy3 cm prostate, minor to moderate trabeculations, negative otherwise.    Flomax 0.4-  caused sever dizzeness.  Stopped this    Patient has had a CT several years ago that did show some nonobstructing stones.  He has never passed a kidney stone.    Patient has been  worked up for gross hematuria before    2002 cystoscopy negative, also had some imaging at that time that was negative      3/20 Cr 0.74, GFR 11    PSA     11/21  2.89  6/20   4.38  3/20  7.32  2/20  7.38  6/19 3.83  5/18 2.85  3/16 2.26  1/13 1.63  12/12 1.87  10/06 3.1    Sees PCP yearly        Objective     Past Medical History:   Diagnosis Date    Benign prostatic hyperplasia with urinary retention 5/10/2022    Bladder disorder     BPH (benign prostatic hyperplasia)     Chronic allergic rhinitis     Kidney stones     Mood disorder     Prostate disorder     Prostatitis 1 to 2 yrs ago    UTI (urinary tract infection)              Vital Signs:   There were no vitals taken for this visit.            Assessment and Plan    Diagnoses and all orders for this visit:    1. Benign prostatic hyperplasia with lower urinary tract symptoms, symptom details unspecified (Primary)          BPH       Bothered by frequency and urgency.    Discussion he is bothered enough he would like to try medication we will place him on Flomax 0.4 mg daily.  Risk and benefits discussed     Follow-up in 3 months.

## 2025-01-24 ENCOUNTER — OFFICE VISIT (OUTPATIENT)
Dept: UROLOGY | Age: 75
End: 2025-01-24
Payer: MEDICARE

## 2025-01-24 VITALS — WEIGHT: 168.2 LBS | BODY MASS INDEX: 22.78 KG/M2 | HEIGHT: 72 IN

## 2025-01-24 DIAGNOSIS — N40.1 BENIGN PROSTATIC HYPERPLASIA WITH LOWER URINARY TRACT SYMPTOMS, SYMPTOM DETAILS UNSPECIFIED: Primary | ICD-10-CM

## 2025-01-24 DIAGNOSIS — R35.0 URINARY FREQUENCY: ICD-10-CM

## 2025-01-24 LAB — URINE VOLUME: 0

## 2025-01-24 RX ORDER — TAMSULOSIN HYDROCHLORIDE 0.4 MG/1
1 CAPSULE ORAL DAILY
Qty: 90 CAPSULE | Refills: 4 | Status: SHIPPED | OUTPATIENT
Start: 2025-01-24

## 2025-04-26 NOTE — PROGRESS NOTES
Chief Complaint: Urologic complaint    Subjective         History of Present Illness  Pa Montano is a 74 y.o. male        74-M    BPH status post Rezum in 2/20  Distal anterior urethral stricture/almost meatal      Flomax 0.4 - could not tolerate - caused dizziness - helped him void better - off flomax now      Ok stream.  Dealing with frequency.  Noc X 3   No straining.    safety pad     No GH    no prostate meds.    No cardiopulmonary history.  Patient does not smoke.  Patient does not use blood thinner.        Not worried about erections       very active.  Walks 5 miles every day.      PVR     4/25     000  1/25      000  1/24     25  1/23     15  2/21      50  12/20    120  12/20    408  10/19/20  Rezum  11/20  25  3/20    72        Previous    Was passing a catheter once a week at 1 point for urethral stricture but has not done this for a long time      8/22 urethral dilation in office - meatal stricture 2 mm into the urethra.  Dilated to 24 cm prostate open defect at the bladder neck but labs are coapting at the verumontanum.  Moderate trabeculations    6/22 cystoscopy with urethral dilation   -dilated from 12-22 Sami, meatal stricture -4 cm lobes are coapting close to the verumontanum open at the bladder neck.  Moderate trabeculations throughout    5/14/2022 cystoscopy with basket removal of stone and urethral dilation - 3 cm prostate with open TUR defect from Rezu.  Lobes were just barely coapted.  Some undermining from the Rezu defect at the prostatic urethra.  Place catheter over wire.  3 urethral stones 8 mm, 9 mm and 1 cm.  All stones removed    Flomax-could not tolerate it made dizzy       history of prostatitis         No ED, has used tadalafil in the past                         9/20 cystoscopy3 cm prostate, minor to moderate trabeculations, negative otherwise.    Flomax 0.4-  caused sever dizzeness.  Stopped this    Patient has had a CT several years ago that did show some nonobstructing  stones.  He has never passed a kidney stone.    Patient has been worked up for gross hematuria before    2002 cystoscopy negative, also had some imaging at that time that was negative      3/20 Cr 0.74, GFR 11    PSA     11/21  2.89  6/20   4.38  3/20  7.32  2/20  7.38  6/19 3.83  5/18 2.85  3/16 2.26  1/13 1.63  12/12 1.87  10/06 3.1    Sees PCP yearly        Objective     Past Medical History:   Diagnosis Date    Benign prostatic hyperplasia with urinary retention 5/10/2022    Bladder disorder     BPH (benign prostatic hyperplasia)     Chronic allergic rhinitis     Kidney stones     Mood disorder     Prostate disorder     Prostatitis 1 to 2 yrs ago    UTI (urinary tract infection)              Vital Signs:   There were no vitals taken for this visit.            Assessment and Plan    Diagnoses and all orders for this visit:    1. Benign prostatic hyperplasia with lower urinary tract symptoms, symptom details unspecified (Primary)          BPH       No prostate meds at this time    Discussed TURP today.  Risks and benefits were discussed including bleeding, infection and damage to the urinary system.  We also discussed the risk of anesthesia up to and including death.  Patient voiced understanding    Discussed 1% risk of incontinence    At this time not interested in procedures he may be in the future        F/u with NP in 6 mths

## 2025-04-28 ENCOUNTER — OFFICE VISIT (OUTPATIENT)
Dept: UROLOGY | Age: 75
End: 2025-04-28
Payer: MEDICARE

## 2025-04-28 VITALS — BODY MASS INDEX: 22.78 KG/M2 | WEIGHT: 168 LBS

## 2025-04-28 DIAGNOSIS — N40.1 BENIGN PROSTATIC HYPERPLASIA WITH LOWER URINARY TRACT SYMPTOMS, SYMPTOM DETAILS UNSPECIFIED: Primary | ICD-10-CM

## 2025-04-28 PROCEDURE — 51798 US URINE CAPACITY MEASURE: CPT | Performed by: UROLOGY

## 2025-04-28 PROCEDURE — 1159F MED LIST DOCD IN RCRD: CPT | Performed by: UROLOGY

## 2025-04-28 PROCEDURE — 99213 OFFICE O/P EST LOW 20 MIN: CPT | Performed by: UROLOGY

## 2025-04-28 PROCEDURE — 1160F RVW MEDS BY RX/DR IN RCRD: CPT | Performed by: UROLOGY

## (undated) DEVICE — CATH FOL COUNCL 2WY 18F 5CC

## (undated) DEVICE — SOL IRR NACL 0.9PCT 3000ML

## (undated) DEVICE — GLV SURG SENSICARE SLT PF LF 8 STRL

## (undated) DEVICE — CYSTO PACK: Brand: MEDLINE INDUSTRIES, INC.

## (undated) DEVICE — TRAY,IRRIGATION,PISTON SYRINGE,60ML: Brand: MEDLINE

## (undated) DEVICE — SOL IRR H2O BTL 1000ML STRL

## (undated) DEVICE — Y-TYPE TUR/BLADDER IRRIGATION SET, REGULATING CLAMP

## (undated) DEVICE — TRY SKINPREP PVP SCRB W PAINT

## (undated) DEVICE — Device

## (undated) DEVICE — URINE DRAINAGE BAG,BAG, NEEDLE SAMPLING, DRAIN TUBE: Brand: DOVER

## (undated) DEVICE — NITINOL STONE RETRIEVAL BASKET: Brand: ESCAPE

## (undated) DEVICE — MAT FLR ABS W/BLU/LINER 56X72IN WHT

## (undated) DEVICE — MEDI-VAC NON-CONDUCTIVE SUCTION TUBING: Brand: CARDINAL HEALTH